# Patient Record
Sex: FEMALE | Race: WHITE | Employment: UNEMPLOYED | URBAN - NONMETROPOLITAN AREA
[De-identification: names, ages, dates, MRNs, and addresses within clinical notes are randomized per-mention and may not be internally consistent; named-entity substitution may affect disease eponyms.]

---

## 2017-04-06 ENCOUNTER — APPOINTMENT (OUTPATIENT)
Dept: GENERAL RADIOLOGY | Age: 42
End: 2017-04-06
Payer: COMMERCIAL

## 2017-04-06 ENCOUNTER — HOSPITAL ENCOUNTER (EMERGENCY)
Age: 42
Discharge: HOME | End: 2017-04-06
Attending: EMERGENCY MEDICINE
Payer: COMMERCIAL

## 2017-04-06 VITALS
OXYGEN SATURATION: 98 % | RESPIRATION RATE: 16 BRPM | TEMPERATURE: 97.8 F | HEART RATE: 87 BPM | DIASTOLIC BLOOD PRESSURE: 89 MMHG | SYSTOLIC BLOOD PRESSURE: 140 MMHG

## 2017-04-06 DIAGNOSIS — G43.A0 CYCLICAL VOMITING WITHOUT NAUSEA, INTRACTABILITY OF VOMITING NOT SPECIFIED: Primary | ICD-10-CM

## 2017-04-06 LAB
-: ABNORMAL
ABSOLUTE EOS #: 0 K/UL (ref 0–0.4)
ABSOLUTE LYMPH #: 1.3 K/UL (ref 1.1–2.7)
ABSOLUTE MONO #: 0.3 K/UL (ref 0–1)
AMORPHOUS: ABNORMAL
ANION GAP SERPL CALCULATED.3IONS-SCNC: 22 MMOL/L (ref 9–17)
BACTERIA: ABNORMAL
BASOPHILS # BLD: 1 % (ref 0–2)
BASOPHILS ABSOLUTE: 0.1 K/UL (ref 0–0.2)
BILIRUBIN URINE: NEGATIVE
BUN BLDV-MCNC: 15 MG/DL (ref 6–20)
BUN/CREAT BLD: 22 (ref 9–20)
CALCIUM SERPL-MCNC: 9.9 MG/DL (ref 8.6–10.4)
CASTS UA: ABNORMAL /LPF
CHLORIDE BLD-SCNC: 98 MMOL/L (ref 98–107)
CO2: 18 MMOL/L (ref 20–31)
COLOR: YELLOW
COMMENT UA: ABNORMAL
CREAT SERPL-MCNC: 0.68 MG/DL (ref 0.5–0.9)
CRYSTALS, UA: ABNORMAL /HPF
DIFFERENTIAL TYPE: YES
EOSINOPHILS RELATIVE PERCENT: 0 % (ref 0–5)
EPITHELIAL CELLS UA: ABNORMAL /HPF
ETHANOL PERCENT: <0.01 %
ETHANOL: <10 MG/DL
GFR AFRICAN AMERICAN: >60 ML/MIN
GFR NON-AFRICAN AMERICAN: >60 ML/MIN
GFR SERPL CREATININE-BSD FRML MDRD: ABNORMAL ML/MIN/{1.73_M2}
GFR SERPL CREATININE-BSD FRML MDRD: ABNORMAL ML/MIN/{1.73_M2}
GLUCOSE BLD-MCNC: 81 MG/DL (ref 70–99)
GLUCOSE URINE: NEGATIVE
HCG(URINE) PREGNANCY TEST: NEGATIVE
HCT VFR BLD CALC: 46.8 % (ref 36–46)
HEMOGLOBIN: 15.4 G/DL (ref 12–16)
KETONES, URINE: ABNORMAL
LEUKOCYTE ESTERASE, URINE: NEGATIVE
LIPASE: 15 U/L (ref 13–60)
LYMPHOCYTES # BLD: 15 % (ref 15–40)
MCH RBC QN AUTO: 28.4 PG (ref 26–34)
MCHC RBC AUTO-ENTMCNC: 33 G/DL (ref 31–37)
MCV RBC AUTO: 86.1 FL (ref 80–100)
MONOCYTES # BLD: 3 % (ref 4–8)
MUCUS: ABNORMAL
NITRITE, URINE: NEGATIVE
OTHER OBSERVATIONS UA: ABNORMAL
PDW BLD-RTO: 15.4 % (ref 12.1–15.2)
PH UA: 6 (ref 5–8)
PLATELET # BLD: 306 K/UL (ref 140–450)
PLATELET ESTIMATE: ABNORMAL
PMV BLD AUTO: ABNORMAL FL (ref 6–12)
POTASSIUM SERPL-SCNC: 4.2 MMOL/L (ref 3.7–5.3)
PROTEIN UA: ABNORMAL
RBC # BLD: 5.44 M/UL (ref 4–5.2)
RBC # BLD: ABNORMAL 10*6/UL
RBC UA: ABNORMAL /HPF (ref 0–2)
RENAL EPITHELIAL, UA: ABNORMAL /HPF
SEG NEUTROPHILS: 81 % (ref 47–75)
SEGMENTED NEUTROPHILS ABSOLUTE COUNT: 6.9 K/UL (ref 2.5–7)
SODIUM BLD-SCNC: 138 MMOL/L (ref 135–144)
SPECIFIC GRAVITY UA: 1.03 (ref 1–1.03)
TRICHOMONAS: ABNORMAL
TURBIDITY: CLEAR
URINE HGB: ABNORMAL
UROBILINOGEN, URINE: NORMAL
WBC # BLD: 8.6 K/UL (ref 3.5–11)
WBC # BLD: ABNORMAL 10*3/UL
WBC UA: ABNORMAL /HPF
YEAST: ABNORMAL

## 2017-04-06 PROCEDURE — 80048 BASIC METABOLIC PNL TOTAL CA: CPT

## 2017-04-06 PROCEDURE — 74022 RADEX COMPL AQT ABD SERIES: CPT

## 2017-04-06 PROCEDURE — 83690 ASSAY OF LIPASE: CPT

## 2017-04-06 PROCEDURE — 85025 COMPLETE CBC W/AUTO DIFF WBC: CPT

## 2017-04-06 PROCEDURE — 6360000002 HC RX W HCPCS: Performed by: EMERGENCY MEDICINE

## 2017-04-06 PROCEDURE — 99284 EMERGENCY DEPT VISIT MOD MDM: CPT

## 2017-04-06 PROCEDURE — 84703 CHORIONIC GONADOTROPIN ASSAY: CPT

## 2017-04-06 PROCEDURE — 81001 URINALYSIS AUTO W/SCOPE: CPT

## 2017-04-06 PROCEDURE — G0480 DRUG TEST DEF 1-7 CLASSES: HCPCS

## 2017-04-06 RX ORDER — ONDANSETRON 2 MG/ML
4 INJECTION INTRAMUSCULAR; INTRAVENOUS ONCE
Status: DISCONTINUED | OUTPATIENT
Start: 2017-04-06 | End: 2017-04-06

## 2017-04-06 RX ORDER — ONDANSETRON 4 MG/1
4 TABLET, ORALLY DISINTEGRATING ORAL EVERY 8 HOURS PRN
Qty: 6 TABLET | Refills: 0 | Status: SHIPPED | OUTPATIENT
Start: 2017-04-06 | End: 2017-04-08

## 2017-04-06 RX ORDER — ONDANSETRON 4 MG/1
4 TABLET, ORALLY DISINTEGRATING ORAL ONCE
Status: DISCONTINUED | OUTPATIENT
Start: 2017-04-06 | End: 2017-04-06

## 2017-04-06 RX ORDER — ONDANSETRON 4 MG/1
4 TABLET, ORALLY DISINTEGRATING ORAL ONCE
Status: COMPLETED | OUTPATIENT
Start: 2017-04-06 | End: 2017-04-06

## 2017-04-06 RX ADMIN — ONDANSETRON 4 MG: 4 TABLET, ORALLY DISINTEGRATING ORAL at 13:47

## 2017-04-06 ASSESSMENT — ENCOUNTER SYMPTOMS
ANAL BLEEDING: 0
ALLERGIC/IMMUNOLOGIC NEGATIVE: 1
RECENT COUGH: 0
NAUSEA: 1
VOMITING: 1
EYES NEGATIVE: 1
RECTAL PAIN: 0
DIARRHEA: 0
BLOOD IN STOOL: 0
SORE THROAT: 0
CONSTIPATION: 0
ABDOMINAL PAIN: 0
RESPIRATORY NEGATIVE: 1
ABDOMINAL DISTENTION: 0

## 2017-04-10 ENCOUNTER — HOSPITAL ENCOUNTER (EMERGENCY)
Age: 42
Discharge: HOME | End: 2017-04-10
Attending: EMERGENCY MEDICINE
Payer: COMMERCIAL

## 2017-04-10 VITALS
DIASTOLIC BLOOD PRESSURE: 100 MMHG | OXYGEN SATURATION: 100 % | HEART RATE: 71 BPM | RESPIRATION RATE: 16 BRPM | TEMPERATURE: 99.6 F | SYSTOLIC BLOOD PRESSURE: 168 MMHG

## 2017-04-10 DIAGNOSIS — K29.00 ACUTE GASTRITIS WITHOUT HEMORRHAGE, UNSPECIFIED GASTRITIS TYPE: ICD-10-CM

## 2017-04-10 DIAGNOSIS — R10.13 ABDOMINAL PAIN, EPIGASTRIC: Primary | ICD-10-CM

## 2017-04-10 LAB
ABSOLUTE BANDS #: 0.07 K/UL (ref 0–1)
ABSOLUTE EOS #: 0.07 K/UL (ref 0–0.4)
ABSOLUTE LYMPH #: 0.78 K/UL (ref 1.1–2.7)
ABSOLUTE MONO #: 0.36 K/UL (ref 0–1)
ALBUMIN SERPL-MCNC: 4.3 G/DL (ref 3.5–5.2)
ALBUMIN/GLOBULIN RATIO: ABNORMAL (ref 1–2.5)
ALP BLD-CCNC: 73 U/L (ref 35–104)
ALT SERPL-CCNC: 15 U/L (ref 5–33)
ANION GAP SERPL CALCULATED.3IONS-SCNC: 14 MMOL/L (ref 9–17)
AST SERPL-CCNC: 15 U/L
BANDS: 1 % (ref 0–10)
BASOPHILS # BLD: ABNORMAL % (ref 0–2)
BASOPHILS ABSOLUTE: ABNORMAL K/UL (ref 0–0.2)
BILIRUB SERPL-MCNC: 0.34 MG/DL (ref 0.3–1.2)
BUN BLDV-MCNC: 14 MG/DL (ref 6–20)
BUN/CREAT BLD: 22 (ref 9–20)
CALCIUM SERPL-MCNC: 9.6 MG/DL (ref 8.6–10.4)
CHLORIDE BLD-SCNC: 100 MMOL/L (ref 98–107)
CO2: 25 MMOL/L (ref 20–31)
CREAT SERPL-MCNC: 0.65 MG/DL (ref 0.5–0.9)
DIFFERENTIAL TYPE: ABNORMAL
EOSINOPHILS RELATIVE PERCENT: 1 % (ref 0–5)
GFR AFRICAN AMERICAN: >60 ML/MIN
GFR NON-AFRICAN AMERICAN: >60 ML/MIN
GFR SERPL CREATININE-BSD FRML MDRD: ABNORMAL ML/MIN/{1.73_M2}
GFR SERPL CREATININE-BSD FRML MDRD: ABNORMAL ML/MIN/{1.73_M2}
GLUCOSE BLD-MCNC: 119 MG/DL (ref 70–99)
HCT VFR BLD CALC: 45.1 % (ref 36–46)
HEMOGLOBIN: 14.7 G/DL (ref 12–16)
LIPASE: 23 U/L (ref 13–60)
LYMPHOCYTES # BLD: 11 % (ref 15–40)
MCH RBC QN AUTO: 28.1 PG (ref 26–34)
MCHC RBC AUTO-ENTMCNC: 32.7 G/DL (ref 31–37)
MCV RBC AUTO: 86 FL (ref 80–100)
MONOCYTES # BLD: 5 % (ref 4–8)
MORPHOLOGY: ABNORMAL
PDW BLD-RTO: 15.3 % (ref 12.1–15.2)
PLATELET # BLD: 195 K/UL (ref 140–450)
PLATELET ESTIMATE: ABNORMAL
PMV BLD AUTO: ABNORMAL FL (ref 6–12)
POTASSIUM SERPL-SCNC: 3.9 MMOL/L (ref 3.7–5.3)
RBC # BLD: 5.24 M/UL (ref 4–5.2)
RBC # BLD: ABNORMAL 10*6/UL
SEG NEUTROPHILS: 82 % (ref 47–75)
SEGMENTED NEUTROPHILS ABSOLUTE COUNT: 5.82 K/UL (ref 2.5–7)
SODIUM BLD-SCNC: 139 MMOL/L (ref 135–144)
TOTAL PROTEIN: 7.8 G/DL (ref 6.4–8.3)
WBC # BLD: 7.1 K/UL (ref 3.5–11)
WBC # BLD: ABNORMAL 10*3/UL

## 2017-04-10 PROCEDURE — 80053 COMPREHEN METABOLIC PANEL: CPT

## 2017-04-10 PROCEDURE — 36415 COLL VENOUS BLD VENIPUNCTURE: CPT

## 2017-04-10 PROCEDURE — 85025 COMPLETE CBC W/AUTO DIFF WBC: CPT

## 2017-04-10 PROCEDURE — 96372 THER/PROPH/DIAG INJ SC/IM: CPT

## 2017-04-10 PROCEDURE — 83690 ASSAY OF LIPASE: CPT

## 2017-04-10 PROCEDURE — 6360000002 HC RX W HCPCS: Performed by: EMERGENCY MEDICINE

## 2017-04-10 PROCEDURE — 99284 EMERGENCY DEPT VISIT MOD MDM: CPT

## 2017-04-10 RX ORDER — PROMETHAZINE HYDROCHLORIDE 25 MG/1
25 TABLET ORAL EVERY 6 HOURS PRN
Qty: 12 TABLET | Refills: 0 | Status: SHIPPED | OUTPATIENT
Start: 2017-04-10 | End: 2017-04-17

## 2017-04-10 RX ORDER — NALBUPHINE HCL 10 MG/ML
10 AMPUL (ML) INJECTION ONCE
Status: COMPLETED | OUTPATIENT
Start: 2017-04-10 | End: 2017-04-10

## 2017-04-10 RX ORDER — PROMETHAZINE HYDROCHLORIDE 25 MG/ML
25 INJECTION, SOLUTION INTRAMUSCULAR; INTRAVENOUS ONCE
Status: COMPLETED | OUTPATIENT
Start: 2017-04-10 | End: 2017-04-10

## 2017-04-10 RX ORDER — METOCLOPRAMIDE HYDROCHLORIDE 5 MG/ML
10 INJECTION INTRAMUSCULAR; INTRAVENOUS ONCE
Status: COMPLETED | OUTPATIENT
Start: 2017-04-10 | End: 2017-04-10

## 2017-04-10 RX ADMIN — METOCLOPRAMIDE 10 MG: 5 INJECTION, SOLUTION INTRAMUSCULAR; INTRAVENOUS at 13:42

## 2017-04-10 RX ADMIN — NALBUPHINE HYDROCHLORIDE 10 MG: 10 INJECTION, SOLUTION INTRAMUSCULAR; INTRAVENOUS; SUBCUTANEOUS at 13:42

## 2017-04-10 RX ADMIN — PROMETHAZINE HYDROCHLORIDE 25 MG: 25 INJECTION INTRAMUSCULAR; INTRAVENOUS at 11:26

## 2017-04-10 ASSESSMENT — PAIN SCALES - GENERAL: PAINLEVEL_OUTOF10: 9

## 2020-06-29 ENCOUNTER — HOSPITAL ENCOUNTER (EMERGENCY)
Age: 45
Discharge: HOME | End: 2020-06-29
Attending: FAMILY MEDICINE
Payer: COMMERCIAL

## 2020-06-29 VITALS
DIASTOLIC BLOOD PRESSURE: 72 MMHG | OXYGEN SATURATION: 97 % | HEART RATE: 102 BPM | BODY MASS INDEX: 22.74 KG/M2 | TEMPERATURE: 99.4 F | HEIGHT: 69 IN | RESPIRATION RATE: 20 BRPM | SYSTOLIC BLOOD PRESSURE: 162 MMHG

## 2020-06-29 PROCEDURE — 99283 EMERGENCY DEPT VISIT LOW MDM: CPT

## 2020-06-29 RX ORDER — IBUPROFEN 600 MG/1
600 TABLET ORAL EVERY 6 HOURS PRN
Qty: 30 TABLET | Refills: 0 | Status: SHIPPED | OUTPATIENT
Start: 2020-06-29

## 2020-06-29 RX ORDER — AMOXICILLIN AND CLAVULANATE POTASSIUM 875; 125 MG/1; MG/1
1 TABLET, FILM COATED ORAL 2 TIMES DAILY
Qty: 20 TABLET | Refills: 0 | Status: SHIPPED | OUTPATIENT
Start: 2020-06-29 | End: 2020-07-09

## 2020-06-29 RX ORDER — ACETAMINOPHEN 325 MG/1
650 TABLET ORAL EVERY 6 HOURS PRN
COMMUNITY

## 2020-06-29 ASSESSMENT — ENCOUNTER SYMPTOMS
NAUSEA: 0
EYE DISCHARGE: 1
PHOTOPHOBIA: 0

## 2020-06-29 ASSESSMENT — PAIN DESCRIPTION - PROGRESSION: CLINICAL_PROGRESSION: NOT CHANGED

## 2020-06-29 ASSESSMENT — PAIN DESCRIPTION - PAIN TYPE: TYPE: ACUTE PAIN

## 2020-06-29 ASSESSMENT — PAIN DESCRIPTION - ORIENTATION: ORIENTATION: LEFT

## 2020-06-29 ASSESSMENT — PAIN DESCRIPTION - DESCRIPTORS: DESCRIPTORS: HEADACHE

## 2020-06-29 ASSESSMENT — PAIN - FUNCTIONAL ASSESSMENT: PAIN_FUNCTIONAL_ASSESSMENT: ACTIVITIES ARE NOT PREVENTED

## 2020-06-29 ASSESSMENT — PAIN DESCRIPTION - LOCATION: LOCATION: EAR

## 2020-06-29 ASSESSMENT — PAIN DESCRIPTION - FREQUENCY: FREQUENCY: CONTINUOUS

## 2020-06-29 ASSESSMENT — PAIN SCALES - GENERAL: PAINLEVEL_OUTOF10: 7
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
## 2024-04-05 ENCOUNTER — OFFICE VISIT (OUTPATIENT)
Dept: FAMILY MEDICINE CLINIC | Age: 49
End: 2024-04-05

## 2024-04-05 VITALS
DIASTOLIC BLOOD PRESSURE: 80 MMHG | SYSTOLIC BLOOD PRESSURE: 124 MMHG | OXYGEN SATURATION: 99 % | BODY MASS INDEX: 23.13 KG/M2 | WEIGHT: 156.2 LBS | HEART RATE: 92 BPM | HEIGHT: 69 IN

## 2024-04-05 DIAGNOSIS — F19.91 HISTORY OF DRUG USE: ICD-10-CM

## 2024-04-05 DIAGNOSIS — F31.5 BIPOLAR DISORDER, CURR EPISODE DEPRESSED, SEVERE, W/PSYCHOTIC FEATURES (HCC): ICD-10-CM

## 2024-04-05 DIAGNOSIS — B18.2 CHRONIC HEPATITIS C WITHOUT HEPATIC COMA (HCC): ICD-10-CM

## 2024-04-05 DIAGNOSIS — J45.20 MILD INTERMITTENT ASTHMA WITHOUT COMPLICATION: ICD-10-CM

## 2024-04-05 DIAGNOSIS — K50.10 CROHN'S DISEASE OF COLON WITHOUT COMPLICATION (HCC): ICD-10-CM

## 2024-04-05 DIAGNOSIS — F51.01 PRIMARY INSOMNIA: Primary | ICD-10-CM

## 2024-04-05 DIAGNOSIS — F33.3 SEVERE EPISODE OF RECURRENT MAJOR DEPRESSIVE DISORDER, WITH PSYCHOTIC FEATURES (HCC): ICD-10-CM

## 2024-04-05 DIAGNOSIS — G62.9 POLYNEUROPATHY: ICD-10-CM

## 2024-04-05 RX ORDER — ALBUTEROL SULFATE 90 UG/1
2 AEROSOL, METERED RESPIRATORY (INHALATION) EVERY 6 HOURS PRN
COMMUNITY
Start: 2023-08-19 | End: 2024-04-05 | Stop reason: SDUPTHER

## 2024-04-05 RX ORDER — RISPERIDONE 1 MG/1
1 TABLET ORAL 2 TIMES DAILY
COMMUNITY
Start: 2024-01-10 | End: 2024-04-05 | Stop reason: SDUPTHER

## 2024-04-05 RX ORDER — RISPERIDONE 1 MG/1
1 TABLET ORAL 2 TIMES DAILY
Qty: 60 TABLET | Refills: 2 | Status: SHIPPED | OUTPATIENT
Start: 2024-04-05

## 2024-04-05 RX ORDER — ALBUTEROL SULFATE 90 UG/1
2 AEROSOL, METERED RESPIRATORY (INHALATION) EVERY 6 HOURS PRN
Qty: 18 G | Refills: 2 | Status: SHIPPED | OUTPATIENT
Start: 2024-04-05

## 2024-04-05 RX ORDER — TRAZODONE HYDROCHLORIDE 50 MG/1
50 TABLET ORAL NIGHTLY
COMMUNITY
Start: 2024-01-10 | End: 2024-04-05 | Stop reason: SDUPTHER

## 2024-04-05 RX ORDER — TRAZODONE HYDROCHLORIDE 100 MG/1
100 TABLET ORAL NIGHTLY
Qty: 30 TABLET | Refills: 2 | Status: SHIPPED | OUTPATIENT
Start: 2024-04-05

## 2024-04-05 RX ORDER — QUETIAPINE FUMARATE 50 MG/1
50 TABLET, FILM COATED ORAL NIGHTLY
Qty: 60 TABLET | Status: CANCELLED | OUTPATIENT
Start: 2024-04-05

## 2024-04-05 RX ORDER — QUETIAPINE FUMARATE 50 MG/1
50 TABLET, FILM COATED ORAL NIGHTLY
COMMUNITY
Start: 2023-08-19

## 2024-04-05 SDOH — ECONOMIC STABILITY: HOUSING INSECURITY
IN THE LAST 12 MONTHS, WAS THERE A TIME WHEN YOU DID NOT HAVE A STEADY PLACE TO SLEEP OR SLEPT IN A SHELTER (INCLUDING NOW)?: NO

## 2024-04-05 SDOH — ECONOMIC STABILITY: FOOD INSECURITY: WITHIN THE PAST 12 MONTHS, YOU WORRIED THAT YOUR FOOD WOULD RUN OUT BEFORE YOU GOT MONEY TO BUY MORE.: NEVER TRUE

## 2024-04-05 SDOH — ECONOMIC STABILITY: INCOME INSECURITY: HOW HARD IS IT FOR YOU TO PAY FOR THE VERY BASICS LIKE FOOD, HOUSING, MEDICAL CARE, AND HEATING?: NOT HARD AT ALL

## 2024-04-05 SDOH — ECONOMIC STABILITY: FOOD INSECURITY: WITHIN THE PAST 12 MONTHS, THE FOOD YOU BOUGHT JUST DIDN'T LAST AND YOU DIDN'T HAVE MONEY TO GET MORE.: NEVER TRUE

## 2024-04-05 ASSESSMENT — ENCOUNTER SYMPTOMS
BLOOD IN STOOL: 0
DIARRHEA: 0
COUGH: 0
SHORTNESS OF BREATH: 0
ABDOMINAL PAIN: 0
VOMITING: 0
BACK PAIN: 0
SORE THROAT: 0
CONSTIPATION: 0

## 2024-04-05 ASSESSMENT — PATIENT HEALTH QUESTIONNAIRE - PHQ9
SUM OF ALL RESPONSES TO PHQ9 QUESTIONS 1 & 2: 2
SUM OF ALL RESPONSES TO PHQ QUESTIONS 1-9: 2
1. LITTLE INTEREST OR PLEASURE IN DOING THINGS: SEVERAL DAYS
SUM OF ALL RESPONSES TO PHQ QUESTIONS 1-9: 2
SUM OF ALL RESPONSES TO PHQ QUESTIONS 1-9: 2
2. FEELING DOWN, DEPRESSED OR HOPELESS: SEVERAL DAYS
SUM OF ALL RESPONSES TO PHQ QUESTIONS 1-9: 2
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
## 2024-10-09 PROBLEM — B19.20 HEPATITIS C VIRUS INFECTION: Status: ACTIVE | Noted: 2024-10-09

## 2025-01-14 ENCOUNTER — HOSPITAL ENCOUNTER (EMERGENCY)
Age: 50
Discharge: ANOTHER HOSPITAL | End: 2025-01-15
Attending: EMERGENCY MEDICINE
Payer: COMMERCIAL

## 2025-01-14 VITALS
HEART RATE: 94 BPM | RESPIRATION RATE: 19 BRPM | SYSTOLIC BLOOD PRESSURE: 137 MMHG | DIASTOLIC BLOOD PRESSURE: 74 MMHG | BODY MASS INDEX: 20.9 KG/M2 | TEMPERATURE: 98.6 F | OXYGEN SATURATION: 98 % | HEIGHT: 68 IN | WEIGHT: 137.9 LBS

## 2025-01-14 DIAGNOSIS — F30.9 MANIC EPISODE (HCC): Primary | ICD-10-CM

## 2025-01-14 DIAGNOSIS — F31.9 BIPOLAR 1 DISORDER (HCC): ICD-10-CM

## 2025-01-14 LAB
AMPHET UR QL SCN: POSITIVE
ANION GAP SERPL CALCULATED.3IONS-SCNC: 9 MMOL/L (ref 9–17)
BARBITURATES UR QL SCN: NEGATIVE
BENZODIAZ UR QL: NEGATIVE
BUN SERPL-MCNC: 10 MG/DL (ref 6–20)
BUN/CREAT SERPL: 17 (ref 9–20)
CALCIUM SERPL-MCNC: 9.1 MG/DL (ref 8.6–10.4)
CANNABINOIDS UR QL SCN: POSITIVE
CHLORIDE SERPL-SCNC: 98 MMOL/L (ref 98–107)
CO2 SERPL-SCNC: 26 MMOL/L (ref 20–31)
COCAINE UR QL SCN: NEGATIVE
CREAT SERPL-MCNC: 0.6 MG/DL (ref 0.5–0.9)
ERYTHROCYTE [DISTWIDTH] IN BLOOD BY AUTOMATED COUNT: 13.9 % (ref 12.1–15.2)
ETHANOL PERCENT: <0.01 %
ETHANOLAMINE SERPL-MCNC: <10 MG/DL (ref 0–0.08)
FENTANYL UR QL: NEGATIVE
GFR, ESTIMATED: >90 ML/MIN/1.73M2
GLUCOSE SERPL-MCNC: 145 MG/DL (ref 70–99)
HCG UR QL: NEGATIVE
HCT VFR BLD AUTO: 36.2 % (ref 36–46)
HGB BLD-MCNC: 12.1 G/DL (ref 12–16)
MCH RBC QN AUTO: 28.7 PG (ref 26–34)
MCHC RBC AUTO-ENTMCNC: 33.4 G/DL (ref 31–37)
MCV RBC AUTO: 86 FL (ref 80–100)
METHADONE UR QL: NEGATIVE
OPIATES UR QL SCN: NEGATIVE
OXYCODONE UR QL SCN: NEGATIVE
PCP UR QL SCN: NEGATIVE
PLATELET # BLD AUTO: 425 K/UL (ref 140–450)
PMV BLD AUTO: 9.9 FL (ref 6–12)
POTASSIUM SERPL-SCNC: 3.7 MMOL/L (ref 3.7–5.3)
RBC # BLD AUTO: 4.21 M/UL (ref 4–5.2)
SODIUM SERPL-SCNC: 133 MMOL/L (ref 135–144)
TEST INFORMATION: ABNORMAL
TSH SERPL DL<=0.05 MIU/L-ACNC: 1.44 UIU/ML (ref 0.3–5)
WBC OTHER # BLD: 9 K/UL (ref 3.5–11)

## 2025-01-14 PROCEDURE — 36415 COLL VENOUS BLD VENIPUNCTURE: CPT

## 2025-01-14 PROCEDURE — 84443 ASSAY THYROID STIM HORMONE: CPT

## 2025-01-14 PROCEDURE — 80048 BASIC METABOLIC PNL TOTAL CA: CPT

## 2025-01-14 PROCEDURE — G0480 DRUG TEST DEF 1-7 CLASSES: HCPCS

## 2025-01-14 PROCEDURE — 99285 EMERGENCY DEPT VISIT HI MDM: CPT

## 2025-01-14 PROCEDURE — 93005 ELECTROCARDIOGRAM TRACING: CPT | Performed by: EMERGENCY MEDICINE

## 2025-01-14 PROCEDURE — 85027 COMPLETE CBC AUTOMATED: CPT

## 2025-01-14 PROCEDURE — 81025 URINE PREGNANCY TEST: CPT

## 2025-01-14 PROCEDURE — 80307 DRUG TEST PRSMV CHEM ANLYZR: CPT

## 2025-01-14 PROCEDURE — 6370000000 HC RX 637 (ALT 250 FOR IP): Performed by: EMERGENCY MEDICINE

## 2025-01-14 RX ORDER — ACETAMINOPHEN 325 MG/1
650 TABLET ORAL ONCE
Status: COMPLETED | OUTPATIENT
Start: 2025-01-14 | End: 2025-01-14

## 2025-01-14 RX ADMIN — ACETAMINOPHEN 650 MG: 325 TABLET, FILM COATED ORAL at 23:19

## 2025-01-14 ASSESSMENT — LIFESTYLE VARIABLES
HOW MANY STANDARD DRINKS CONTAINING ALCOHOL DO YOU HAVE ON A TYPICAL DAY: 3 OR 4
HOW OFTEN DO YOU HAVE A DRINK CONTAINING ALCOHOL: 2-3 TIMES A WEEK

## 2025-01-14 ASSESSMENT — PAIN SCALES - GENERAL
PAINLEVEL_OUTOF10: 6
PAINLEVEL_OUTOF10: 4

## 2025-01-14 ASSESSMENT — PAIN - FUNCTIONAL ASSESSMENT: PAIN_FUNCTIONAL_ASSESSMENT: 0-10

## 2025-01-14 ASSESSMENT — ENCOUNTER SYMPTOMS: ABDOMINAL PAIN: 0

## 2025-01-14 ASSESSMENT — PAIN DESCRIPTION - ORIENTATION: ORIENTATION: POSTERIOR

## 2025-01-14 ASSESSMENT — PAIN DESCRIPTION - DESCRIPTORS
DESCRIPTORS: ACHING
DESCRIPTORS: ACHING

## 2025-01-14 ASSESSMENT — PAIN DESCRIPTION - LOCATION
LOCATION: HEAD
LOCATION: HEAD

## 2025-01-14 ASSESSMENT — PAIN DESCRIPTION - FREQUENCY: FREQUENCY: CONTINUOUS

## 2025-01-14 ASSESSMENT — PAIN DESCRIPTION - PAIN TYPE: TYPE: ACUTE PAIN

## 2025-01-15 LAB
EKG ATRIAL RATE: 101 BPM
EKG P AXIS: 86 DEGREES
EKG P-R INTERVAL: 120 MS
EKG Q-T INTERVAL: 362 MS
EKG QRS DURATION: 84 MS
EKG QTC CALCULATION (BAZETT): 469 MS
EKG R AXIS: 71 DEGREES
EKG T AXIS: 62 DEGREES
EKG VENTRICULAR RATE: 101 BPM

## 2025-01-15 PROCEDURE — 93010 ELECTROCARDIOGRAM REPORT: CPT | Performed by: INTERNAL MEDICINE
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
## 2025-01-15 NOTE — ED TRIAGE NOTES
Medical and surgical hx verbally reviewed wit patient. Patient is unaware of the last time she took her medication. States she is not taking daily medication at this time.

## 2025-01-15 NOTE — ED PROVIDER NOTES
capsule by mouth daily    QUETIAPINE (SEROQUEL) 50 MG TABLET    Take 1 tablet by mouth nightly    RISPERIDONE (RISPERDAL) 1 MG TABLET    Take 1 tablet by mouth 2 times daily    TRAZODONE (DESYREL) 100 MG TABLET    Take 1 tablet by mouth nightly       ALLERGIES       Phenobarbital    FAMILY HISTORY       Family History   Problem Relation Age of Onset    Neuropathy Mother     Neuropathy Father           SOCIAL HISTORY       Social History     Tobacco Use    Smoking status: Every Day     Current packs/day: 0.50     Types: Cigarettes   Vaping Use    Vaping status: Never Used   Substance Use Topics    Alcohol use: Yes     Alcohol/week: 3.0 standard drinks of alcohol     Types: 3 Shots of liquor per week     Comment: couple times per week; beer 6-7; \"I just started that shit, I take 3 double shots of fireball\"    Drug use: Yes     Frequency: 7.0 times per week     Types: Marijuana (Weed), Methamphetamines (Crystal Meth), Cocaine     Comment: occasionally         PHYSICAL EXAM       ED Triage Vitals [01/14/25 2023]   BP Systolic BP Percentile Diastolic BP Percentile Temp Temp Source Pulse Respirations SpO2   (!) 157/120 -- -- 98.6 °F (37 °C) Oral (!) 139 23 95 %      Height Weight - Scale         1.727 m (5' 8\") 62.6 kg (137 lb 14.4 oz)             Physical Exam  Constitutional:       Appearance: Normal appearance. She is normal weight.   HENT:      Head: Normocephalic and atraumatic.      Nose: Nose normal.      Mouth/Throat:      Pharynx: No oropharyngeal exudate or posterior oropharyngeal erythema.   Eyes:      Extraocular Movements: Extraocular movements intact.      Pupils: Pupils are equal, round, and reactive to light.   Cardiovascular:      Rate and Rhythm: Tachycardia present.      Pulses: Normal pulses.      Heart sounds: Normal heart sounds.   Pulmonary:      Effort: Pulmonary effort is normal. No respiratory distress.      Breath sounds: Normal breath sounds.   Abdominal:      General: Abdomen is flat. There is

## 2025-01-28 ENCOUNTER — HOSPITAL ENCOUNTER (EMERGENCY)
Age: 50
Discharge: ANOTHER HOSPITAL | End: 2025-01-29
Attending: FAMILY MEDICINE
Payer: COMMERCIAL

## 2025-01-28 DIAGNOSIS — Z91.148 NON COMPLIANCE W MEDICATION REGIMEN: ICD-10-CM

## 2025-01-28 DIAGNOSIS — Z86.59 HISTORY OF PSYCHIATRIC HOSPITALIZATION: ICD-10-CM

## 2025-01-28 DIAGNOSIS — F23 ACUTE PSYCHOSIS (HCC): Primary | ICD-10-CM

## 2025-01-28 LAB
-: NORMAL
ALBUMIN SERPL-MCNC: 4.3 G/DL (ref 3.5–5.2)
ALP SERPL-CCNC: 90 U/L (ref 35–104)
ALT SERPL-CCNC: 14 U/L (ref 5–33)
AMPHET UR QL SCN: POSITIVE
ANION GAP SERPL CALCULATED.3IONS-SCNC: 15 MMOL/L (ref 9–17)
APAP SERPL-MCNC: <5 UG/ML (ref 10–30)
AST SERPL-CCNC: 17 U/L
BARBITURATES UR QL SCN: NEGATIVE
BASOPHILS # BLD: 0.02 K/UL (ref 0–0.2)
BASOPHILS NFR BLD: 0 % (ref 0–2)
BENZODIAZ UR QL: NEGATIVE
BILIRUB SERPL-MCNC: 0.2 MG/DL (ref 0.3–1.2)
BILIRUB UR QL STRIP: NEGATIVE
BUN SERPL-MCNC: 5 MG/DL (ref 6–20)
BUN/CREAT SERPL: 7 (ref 9–20)
CALCIUM SERPL-MCNC: 9.1 MG/DL (ref 8.6–10.4)
CANNABINOIDS UR QL SCN: POSITIVE
CHLORIDE SERPL-SCNC: 103 MMOL/L (ref 98–107)
CLARITY UR: CLEAR
CO2 SERPL-SCNC: 20 MMOL/L (ref 20–31)
COCAINE UR QL SCN: NEGATIVE
COLOR UR: ABNORMAL
COMMENT: ABNORMAL
CREAT SERPL-MCNC: 0.7 MG/DL (ref 0.5–0.9)
EOSINOPHIL # BLD: 0.14 K/UL (ref 0–0.4)
EOSINOPHILS RELATIVE PERCENT: 2 % (ref 0–5)
EPI CELLS #/AREA URNS HPF: NORMAL /HPF
ERYTHROCYTE [DISTWIDTH] IN BLOOD BY AUTOMATED COUNT: 14 % (ref 12.1–15.2)
ETHANOL PERCENT: <0.01 %
ETHANOLAMINE SERPL-MCNC: <10 MG/DL (ref 0–0.08)
FENTANYL UR QL: NEGATIVE
GFR, ESTIMATED: >90 ML/MIN/1.73M2
GLUCOSE SERPL-MCNC: 103 MG/DL (ref 70–99)
GLUCOSE UR STRIP-MCNC: NEGATIVE MG/DL
HCG SERPL QL: NEGATIVE
HCT VFR BLD AUTO: 35.1 % (ref 36–46)
HGB BLD-MCNC: 11.6 G/DL (ref 12–16)
HGB UR QL STRIP.AUTO: ABNORMAL
IMM GRANULOCYTES # BLD AUTO: 0 K/UL (ref 0–0.3)
IMM GRANULOCYTES NFR BLD: 0 % (ref 0–5)
KETONES UR STRIP-MCNC: NEGATIVE MG/DL
LEUKOCYTE ESTERASE UR QL STRIP: ABNORMAL
LYMPHOCYTES NFR BLD: 1.58 K/UL (ref 1–4.8)
LYMPHOCYTES RELATIVE PERCENT: 19 % (ref 15–40)
MCH RBC QN AUTO: 28.3 PG (ref 26–34)
MCHC RBC AUTO-ENTMCNC: 33 G/DL (ref 31–37)
MCV RBC AUTO: 85.6 FL (ref 80–100)
METHADONE UR QL: NEGATIVE
MONOCYTES NFR BLD: 0.49 K/UL (ref 0–1)
MONOCYTES NFR BLD: 6 % (ref 4–8)
NEUTROPHILS NFR BLD: 73 % (ref 47–75)
NEUTS SEG NFR BLD: 6.06 K/UL (ref 2.5–7)
NITRITE UR QL STRIP: NEGATIVE
OPIATES UR QL SCN: NEGATIVE
OXYCODONE UR QL SCN: NEGATIVE
PCP UR QL SCN: NEGATIVE
PH UR STRIP: 6 [PH] (ref 5–8)
PLATELET # BLD AUTO: 417 K/UL (ref 140–450)
PMV BLD AUTO: 9.7 FL (ref 6–12)
POTASSIUM SERPL-SCNC: 3.3 MMOL/L (ref 3.7–5.3)
PROT SERPL-MCNC: 7.2 G/DL (ref 6.4–8.3)
PROT UR STRIP-MCNC: ABNORMAL MG/DL
RBC # BLD AUTO: 4.1 M/UL (ref 4–5.2)
RBC #/AREA URNS HPF: NORMAL /HPF (ref 0–2)
SALICYLATES SERPL-MCNC: <1 MG/DL (ref 3–10)
SODIUM SERPL-SCNC: 138 MMOL/L (ref 135–144)
SP GR UR STRIP: 1.01 (ref 1–1.03)
TEST INFORMATION: ABNORMAL
UROBILINOGEN UR STRIP-ACNC: NORMAL EU/DL (ref 0–1)
WBC #/AREA URNS HPF: NORMAL /HPF
WBC OTHER # BLD: 8.3 K/UL (ref 3.5–11)

## 2025-01-28 PROCEDURE — 36415 COLL VENOUS BLD VENIPUNCTURE: CPT

## 2025-01-28 PROCEDURE — 87186 SC STD MICRODIL/AGAR DIL: CPT

## 2025-01-28 PROCEDURE — 81001 URINALYSIS AUTO W/SCOPE: CPT

## 2025-01-28 PROCEDURE — 85025 COMPLETE CBC W/AUTO DIFF WBC: CPT

## 2025-01-28 PROCEDURE — G0480 DRUG TEST DEF 1-7 CLASSES: HCPCS

## 2025-01-28 PROCEDURE — 86403 PARTICLE AGGLUT ANTBDY SCRN: CPT

## 2025-01-28 PROCEDURE — 84703 CHORIONIC GONADOTROPIN ASSAY: CPT

## 2025-01-28 PROCEDURE — 80053 COMPREHEN METABOLIC PANEL: CPT

## 2025-01-28 PROCEDURE — 6370000000 HC RX 637 (ALT 250 FOR IP): Performed by: FAMILY MEDICINE

## 2025-01-28 PROCEDURE — 80307 DRUG TEST PRSMV CHEM ANLYZR: CPT

## 2025-01-28 PROCEDURE — 80143 DRUG ASSAY ACETAMINOPHEN: CPT

## 2025-01-28 PROCEDURE — 87086 URINE CULTURE/COLONY COUNT: CPT

## 2025-01-28 PROCEDURE — 99285 EMERGENCY DEPT VISIT HI MDM: CPT

## 2025-01-28 PROCEDURE — 80179 DRUG ASSAY SALICYLATE: CPT

## 2025-01-28 RX ORDER — ACETAMINOPHEN 500 MG
1000 TABLET ORAL ONCE
Status: COMPLETED | OUTPATIENT
Start: 2025-01-28 | End: 2025-01-28

## 2025-01-28 RX ORDER — LAMOTRIGINE 25 MG/1
50 TABLET ORAL 2 TIMES DAILY
Status: ON HOLD | COMMUNITY

## 2025-01-28 RX ORDER — HYDROXYZINE HYDROCHLORIDE 50 MG/1
50 TABLET, FILM COATED ORAL DAILY PRN
Status: ON HOLD | COMMUNITY

## 2025-01-28 RX ADMIN — ACETAMINOPHEN 1000 MG: 500 TABLET, FILM COATED ORAL at 17:27

## 2025-01-28 ASSESSMENT — PAIN - FUNCTIONAL ASSESSMENT
PAIN_FUNCTIONAL_ASSESSMENT: NONE - DENIES PAIN
PAIN_FUNCTIONAL_ASSESSMENT: 0-10
PAIN_FUNCTIONAL_ASSESSMENT: NONE - DENIES PAIN

## 2025-01-28 ASSESSMENT — PAIN DESCRIPTION - DESCRIPTORS
DESCRIPTORS: TIGHTNESS
DESCRIPTORS: SHARP

## 2025-01-28 ASSESSMENT — PAIN SCALES - GENERAL
PAINLEVEL_OUTOF10: 9
PAINLEVEL_OUTOF10: 9

## 2025-01-28 ASSESSMENT — LIFESTYLE VARIABLES
HOW MANY STANDARD DRINKS CONTAINING ALCOHOL DO YOU HAVE ON A TYPICAL DAY: PATIENT DOES NOT DRINK
HOW OFTEN DO YOU HAVE A DRINK CONTAINING ALCOHOL: NEVER

## 2025-01-28 ASSESSMENT — PAIN DESCRIPTION - LOCATION
LOCATION: CHEST
LOCATION: HEAD

## 2025-01-28 ASSESSMENT — PAIN DESCRIPTION - FREQUENCY: FREQUENCY: INTERMITTENT

## 2025-01-28 NOTE — ED NOTES
Pt c/o being anxious. She was seen two weeks ago in ED for same. Pt states she was sent to the ED by . Pt fearful of going to her home where father is at. Pt restless sitting on bed and appears anxious. Rapid hand and body movements. Pt states she snorted meth three days ago.  During moments of excitement pt c/o chest pain which goes away when calm. Pt in bed. No immediate needs at this time.

## 2025-01-28 NOTE — ED NOTES
Transfer Center Handoff for Behavioral Health Transfers      Patient's Current Location: [facility]  EMERGENCY DEPARTMENT     Chief Complaint   Patient presents with    Anxiety     Pt feeling anxious. Was directed by PO to come to the ED. Was seen here two weeks ago. Pt denies suicidal or homicidal ideations.        Current or History of Violent Behavior: No    Currently in Restraints Now or During this Encounter: No  (Specify if Agitation or self harm is noted in ED?)  If yes, please describe behaviors requiring restraint:             Medical Clearance Documented and Verified in the Chart: Yes    Allergies   Allergen Reactions    Phenobarbital Other (See Comments)     seizure        Can Patient Tolerate Lying Flat: Yes    Able to Perform ADLs:  Yes  (Specify if able to ambulate or uses any mobility devices such as cane or walker)  Activity:    Level of Assistance:    Assistive Device:    Miscellaneous Devices:      LABS    CBC:   Lab Results   Component Value Date/Time    WBC 8.3 01/28/2025 04:15 PM    RBC 4.10 01/28/2025 04:15 PM    HGB 11.6 01/28/2025 04:15 PM    HCT 35.1 01/28/2025 04:15 PM    MCV 85.6 01/28/2025 04:15 PM    MCH 28.3 01/28/2025 04:15 PM    MCHC 33.0 01/28/2025 04:15 PM    RDW 14.0 01/28/2025 04:15 PM     01/28/2025 04:15 PM    MPV 9.7 01/28/2025 04:15 PM     CMP:   Lab Results   Component Value Date/Time     01/28/2025 04:15 PM    K 3.3 01/28/2025 04:15 PM     01/28/2025 04:15 PM    CO2 20 01/28/2025 04:15 PM    BUN 5 01/28/2025 04:15 PM    CREATININE 0.7 01/28/2025 04:15 PM    GFRAA >60 04/10/2017 12:03 PM    LABGLOM >90 01/28/2025 04:15 PM    GLUCOSE 103 01/28/2025 04:15 PM    CALCIUM 9.1 01/28/2025 04:15 PM    BILITOT 0.2 01/28/2025 04:15 PM    ALKPHOS 90 01/28/2025 04:15 PM    AST 17 01/28/2025 04:15 PM    ALT 14 01/28/2025 04:15 PM     Drug Panel:   Lab Results   Component Value Date/Time    OPIAU NEGATIVE 01/28/2025 05:30 PM     UA:  Lab Results   Component Value

## 2025-01-28 NOTE — ED NOTES
Phone provided for pt to call mother. No answer, so pt requesting call to sister. Phone call made and pt conversed with sister. Sister to call mother and answer since she did not know the number. Pt called mother back and conversed with her.

## 2025-01-29 ENCOUNTER — HOSPITAL ENCOUNTER (INPATIENT)
Age: 50
LOS: 6 days | Discharge: HOME | DRG: 753 | End: 2025-02-04
Attending: PSYCHIATRY & NEUROLOGY | Admitting: PSYCHIATRY & NEUROLOGY
Payer: COMMERCIAL

## 2025-01-29 ENCOUNTER — APPOINTMENT (OUTPATIENT)
Dept: GENERAL RADIOLOGY | Age: 50
DRG: 753 | End: 2025-01-29
Attending: INTERNAL MEDICINE
Payer: COMMERCIAL

## 2025-01-29 VITALS
OXYGEN SATURATION: 96 % | SYSTOLIC BLOOD PRESSURE: 131 MMHG | WEIGHT: 140.3 LBS | HEIGHT: 69 IN | RESPIRATION RATE: 18 BRPM | HEART RATE: 78 BPM | TEMPERATURE: 98.2 F | DIASTOLIC BLOOD PRESSURE: 78 MMHG | BODY MASS INDEX: 20.78 KG/M2

## 2025-01-29 PROBLEM — F23 ACUTE PSYCHOSIS (HCC): Status: ACTIVE | Noted: 2025-01-29

## 2025-01-29 PROCEDURE — 6370000000 HC RX 637 (ALT 250 FOR IP): Performed by: INTERNAL MEDICINE

## 2025-01-29 PROCEDURE — 1240000000 HC EMOTIONAL WELLNESS R&B

## 2025-01-29 PROCEDURE — 99222 1ST HOSP IP/OBS MODERATE 55: CPT | Performed by: INTERNAL MEDICINE

## 2025-01-29 PROCEDURE — 73130 X-RAY EXAM OF HAND: CPT

## 2025-01-29 PROCEDURE — 6370000000 HC RX 637 (ALT 250 FOR IP): Performed by: PSYCHIATRY & NEUROLOGY

## 2025-01-29 PROCEDURE — 99223 1ST HOSP IP/OBS HIGH 75: CPT | Performed by: PSYCHIATRY & NEUROLOGY

## 2025-01-29 RX ORDER — POTASSIUM CHLORIDE 1500 MG/1
40 TABLET, EXTENDED RELEASE ORAL ONCE
Status: COMPLETED | OUTPATIENT
Start: 2025-01-29 | End: 2025-01-29

## 2025-01-29 RX ORDER — QUETIAPINE FUMARATE 50 MG/1
50 TABLET, EXTENDED RELEASE ORAL NIGHTLY
Status: ON HOLD | COMMUNITY
End: 2025-02-04 | Stop reason: HOSPADM

## 2025-01-29 RX ORDER — IBUPROFEN 400 MG/1
400 TABLET, FILM COATED ORAL EVERY 6 HOURS PRN
Status: DISCONTINUED | OUTPATIENT
Start: 2025-01-29 | End: 2025-01-29

## 2025-01-29 RX ORDER — MAGNESIUM HYDROXIDE/ALUMINUM HYDROXICE/SIMETHICONE 120; 1200; 1200 MG/30ML; MG/30ML; MG/30ML
30 SUSPENSION ORAL EVERY 6 HOURS PRN
Status: DISCONTINUED | OUTPATIENT
Start: 2025-01-29 | End: 2025-02-04 | Stop reason: HOSPADM

## 2025-01-29 RX ORDER — POLYETHYLENE GLYCOL 3350 17 G
2 POWDER IN PACKET (EA) ORAL
Status: DISCONTINUED | OUTPATIENT
Start: 2025-01-29 | End: 2025-02-04 | Stop reason: HOSPADM

## 2025-01-29 RX ORDER — ACETAMINOPHEN 325 MG/1
650 TABLET ORAL EVERY 6 HOURS PRN
Status: DISCONTINUED | OUTPATIENT
Start: 2025-01-29 | End: 2025-02-04 | Stop reason: HOSPADM

## 2025-01-29 RX ORDER — IBUPROFEN 800 MG/1
800 TABLET, FILM COATED ORAL EVERY 8 HOURS PRN
Status: DISPENSED | OUTPATIENT
Start: 2025-01-29 | End: 2025-02-01

## 2025-01-29 RX ORDER — RISPERIDONE 1 MG/1
1 TABLET ORAL 2 TIMES DAILY
Status: DISCONTINUED | OUTPATIENT
Start: 2025-01-29 | End: 2025-02-04 | Stop reason: HOSPADM

## 2025-01-29 RX ORDER — HYDROXYZINE HYDROCHLORIDE 50 MG/1
50 TABLET, FILM COATED ORAL 3 TIMES DAILY PRN
Status: DISCONTINUED | OUTPATIENT
Start: 2025-01-29 | End: 2025-02-04 | Stop reason: HOSPADM

## 2025-01-29 RX ORDER — TRAZODONE HYDROCHLORIDE 50 MG/1
50 TABLET, FILM COATED ORAL NIGHTLY PRN
Status: DISCONTINUED | OUTPATIENT
Start: 2025-01-29 | End: 2025-02-04 | Stop reason: HOSPADM

## 2025-01-29 RX ORDER — POLYETHYLENE GLYCOL 3350 17 G/17G
17 POWDER, FOR SOLUTION ORAL DAILY PRN
Status: DISCONTINUED | OUTPATIENT
Start: 2025-01-29 | End: 2025-02-04 | Stop reason: HOSPADM

## 2025-01-29 RX ADMIN — ACETAMINOPHEN 650 MG: 325 TABLET ORAL at 20:31

## 2025-01-29 RX ADMIN — IBUPROFEN 800 MG: 800 TABLET, FILM COATED ORAL at 14:02

## 2025-01-29 RX ADMIN — RISPERIDONE 1 MG: 1 TABLET, FILM COATED ORAL at 20:31

## 2025-01-29 RX ADMIN — POTASSIUM CHLORIDE 40 MEQ: 20 TABLET, EXTENDED RELEASE ORAL at 14:02

## 2025-01-29 RX ADMIN — HYDROXYZINE HYDROCHLORIDE 50 MG: 50 TABLET ORAL at 14:05

## 2025-01-29 ASSESSMENT — PAIN DESCRIPTION - DESCRIPTORS
DESCRIPTORS: ACHING
DESCRIPTORS: ACHING

## 2025-01-29 ASSESSMENT — SLEEP AND FATIGUE QUESTIONNAIRES
DO YOU USE A SLEEP AID: YES
SLEEP PATTERN: DIFFICULTY FALLING ASLEEP
AVERAGE NUMBER OF SLEEP HOURS: 5
DO YOU HAVE DIFFICULTY SLEEPING: YES

## 2025-01-29 ASSESSMENT — LIFESTYLE VARIABLES
HOW MANY STANDARD DRINKS CONTAINING ALCOHOL DO YOU HAVE ON A TYPICAL DAY: PATIENT DOES NOT DRINK
HOW MANY STANDARD DRINKS CONTAINING ALCOHOL DO YOU HAVE ON A TYPICAL DAY: PATIENT DOES NOT DRINK
HOW OFTEN DO YOU HAVE A DRINK CONTAINING ALCOHOL: NEVER
HOW OFTEN DO YOU HAVE A DRINK CONTAINING ALCOHOL: NEVER

## 2025-01-29 ASSESSMENT — PAIN DESCRIPTION - LOCATION
LOCATION: HAND
LOCATION: HEAD

## 2025-01-29 ASSESSMENT — PAIN - FUNCTIONAL ASSESSMENT: PAIN_FUNCTIONAL_ASSESSMENT: ACTIVITIES ARE NOT PREVENTED

## 2025-01-29 ASSESSMENT — PATIENT HEALTH QUESTIONNAIRE - PHQ9
1. LITTLE INTEREST OR PLEASURE IN DOING THINGS: SEVERAL DAYS
SUM OF ALL RESPONSES TO PHQ QUESTIONS 1-9: 2
SUM OF ALL RESPONSES TO PHQ9 QUESTIONS 1 & 2: 2
SUM OF ALL RESPONSES TO PHQ QUESTIONS 1-9: 2
2. FEELING DOWN, DEPRESSED OR HOPELESS: SEVERAL DAYS

## 2025-01-29 ASSESSMENT — PAIN DESCRIPTION - ORIENTATION: ORIENTATION: RIGHT;LEFT

## 2025-01-29 ASSESSMENT — PAIN SCALES - GENERAL
PAINLEVEL_OUTOF10: 6
PAINLEVEL_OUTOF10: 9

## 2025-01-29 NOTE — PLAN OF CARE
[facility]  Initial Interdisciplinary Treatment Plan NO      Original treatment plan Date & Time: 1/29/2025   1506    Admission Type:  Admission Type: Voluntary    Reason for admission:   Reason for Admission: Pt c/o being anxious. She was seen two weeks ago in ED for same. Pt states she was sent to the ED by . Pt fearful of going to her home where father is at. Lots of stress at home. Believes that meds aren't working.    Estimated Length of Stay:  5-7days  Estimated Discharge Date: to be determined by physician    PATIENT STRENGTHS:  Patient Strengths:   Patient Strengths and Limitations:Limitations: Difficult relationships / poor social skills, Multiple barriers to leisure interests, Inappropriate/potentially harmful leisure interests, Difficulty problem solving/relies on others to help solve problems  Addictive Behavior: Addictive Behavior  In the Past 3 Months, Have You Felt or Has Someone Told You That You Have a Problem With  : None  Medical Problems:  Past Medical History:   Diagnosis Date    Anxiety     Bipolar disorder (HCC)     Cervical cancer (HCC)     Depression     Heroin abuse (HCC)     Neuropathy      Status EXAM:Mental Status and Behavioral Exam  Normal: No  Level of Assistance: Independent/Self  Facial Expression: Exaggerated  Affect: Blunt  Level of Consciousness: Alert  Frequency of Checks: 4 times per hour, close  Mood:Normal: No  Mood: Depressed, Anxious  Motor Activity:Normal: No  Motor Activity: Increased (rapid hand and body movements)  Eye Contact: Fair  Observed Behavior: Cooperative, Friendly  Sexual Misconduct History: Current - no  Preception: Pilot to person, Pilot to time, Pilot to place, Pilot to situation  Attention:Normal: No  Attention: Distractible  Thought Processes: Circumstantial  Thought Content:Normal: Yes  Depression Symptoms: Increased irritability  Anxiety Symptoms: Generalized  Holly Symptoms: No problems reported or

## 2025-01-29 NOTE — ED NOTES
RN in to introduce self. Denies any suicidal or homicidal ideations at this time. States that she is drowsy that she believes to \"from her meds\". States that her whole body feels wore out from situations at home with \"all the arguing\". RN informs pt she will let her rest and that if she needs anything to hit her call light. Verbalizes understanding.

## 2025-01-29 NOTE — ED NOTES
[facility] arrives. RN in to chart belongings. Pt empties coat pocket that contains pocket knife, cigarettes, a cell phone, home medication bottles, an inhaler, 3 vapes, THC products, and misc personal cosmetics. Those belongings, shoes, bra, and sweatshirt bagged and sent with [facility]. Pt leaves wearing pajama pants, t shirt, underwear, coat, and her own socks. Pockets are empty other than a few family pictures pt did not want damaged.

## 2025-01-29 NOTE — PLAN OF CARE
Problem: Anxiety  Goal: Will report anxiety at manageable levels  Description: INTERVENTIONS:  1. Administer medication as ordered  2. Teach and rehearse alternative coping skills  3. Provide emotional support with 1:1 interaction with staff  Outcome: Progressing   Patient reports high anxiety. She states that she has panic attacks constantly. She states that she is very worried about her swelling in her hands and feet. She spoke with internal medicine doctor and was informed of new orders for patient.     Problem: Coping  Goal: Pt/Family able to verbalize concerns and demonstrate effective coping strategies  Description: INTERVENTIONS:  1. Assist patient/family to identify coping skills, available support systems and cultural and spiritual values  2. Provide emotional support, including active listening and acknowledgement of concerns of patient and caregivers  3. Reduce environmental stimuli, as able  4. Instruct patient/family in relaxation techniques, as appropriate  5. Assess for spiritual pain/suffering and initiate Spiritual Care, Psychosocial Clinical Specialist consults as needed  Outcome: Progressing   Patient is not attending groups. She states that she needs to focus on her sleep due to everything that happened before admission. Denied suicidal ideations.

## 2025-01-29 NOTE — BH NOTE
Pt was approached at least 4 times throughout day to complete OQ Questionnaire, and offered assistance by RT related reading questions. Pt declined each time and did not complete OQ today.

## 2025-01-29 NOTE — ED NOTES
Pt sent with all necessary paperwork and charted belongings. Report given to Masood. Report called to ELIAZAR Rowell at [facility].

## 2025-01-29 NOTE — GROUP NOTE
Group Therapy Note    Date: 1/29/2025    Group Start Time: 1100  Group End Time: 1130  Group Topic: Cognitive Skills    Rhoda Barney CTRS        Group Therapy Note    Attendees: 5/16     Topic: To increase socialization, practice self expression, sharing preferences and   memories/experiences with peers and RT, relating to peers' ideas and memories in group.           Comments:   Patient did not participate in Cognitive Skills Group, at 1100, despite staff encouragement.   Pt was resting in room and seclusive to self when invited to group. RT provided accommodations  related to task as needed by participants.     Q15 minute safety checks maintained for patient safety and will continue to encourage   patient to attend unit programming.       Discipline Responsible: Psychoeducational Specialist   Signature: ROBB AHN

## 2025-01-29 NOTE — H&P
[facility] Internal Medicine  George Boogie MD; Horacio Roland MD, Franklin Cornell MD, Kateryna Rabago MD, Miguel Rock MD; Mason Walker MD    [facility] Internal Medicine   IN-PATIENT SERVICE   [facility]     HISTORY AND PHYSICAL EXAMINATION            Date:   1/29/2025  Patient name:  Irma Huizar  Date of admission:  1/29/2025  4:06 AM  MRN:   [id number]  Account:  [id number]  YOB: 1975  PCP:    No primary care provider on file.  Room:   [address] [place] [address]  Code Status:    No Order      Chief Complaint:   Right hand pain    History Obtained From:     Patient/EMR/bedside RN     History of Present Illness:     49-year-old  lady edentulous history of mental health disorder history of street drug abuse complaint of right hand pain and swelling denies any injury claims is going on for 3 weeks  is weaker due to swelling and pain in the joints  Patient does not have any history of rheumatoid arthritis no history of lupus no injuries    Past Medical History:     Past Medical History:   Diagnosis Date    Anxiety     Bipolar disorder (HCC)     Cervical cancer (HCC)     Depression     Heroin abuse (HCC)     Neuropathy         Past Surgical History:     Past Surgical History:   Procedure Laterality Date    CHOLECYSTECTOMY      LAPAROSCOPY          Medications Prior to Admission:     Prior to Admission medications    Medication Sig Start Date End Date Taking? Authorizing Provider   QUEtiapine (SEROQUEL XR) 50 MG extended release tablet Take 1 tablet by mouth nightly    ProviderIsamar MD   hydrOXYzine HCl (ATARAX) 50 MG tablet Take 1 tablet by mouth daily as needed for Anxiety    ProviderIsamar MD   lamoTRIgine (LAMICTAL) 25 MG tablet Take 2 tablets by mouth 2 times daily    ProviderIsamar MD   albuterol sulfate HFA (PROVENTIL;VENTOLIN;PROAIR) 108 (90 Base) MCG/ACT inhaler Inhale 2 puffs into the lungs every 6 hours as

## 2025-01-29 NOTE — H&P
Department of Psychiatry  Attending Physician Psychiatric Assessment   Patient: Irma Huizar  MRN: [id number]  Reason for Admission to Psychiatric Unit:  Threat to self requiring 24 hour professional observation  Threat to others requiring 24 hour professional observation  Acute disordered/bizarre behavior or psychomotor agitation or retardation;interferes with ADLs so that patient cannot function at a less intensive care level of care during evaluation and treatment   Concerns about patient's safety in the community  Past Mental Health Diagnosis: a history of  Bipolar Disorder, Personality Disorder, and Alcohol and/or Drug Use Disorder  Triggering event or precipitating factor: Housing instability, Financial instability, Alcohol/Drug Relapse, and Psych Treatment Noncompliance  Length of time/duration of triggering event: Months  Legal Status: Voluntary    CHIEF COMPLAINT: Acute psychosis    History obtained from: Patient, electronic medical record          HISTORY OF PRESENT ILLNESS:    Irma Huizar is a 49 y.o. female has significant psychiatric history of bipolar disorder with psychotic features, antisocial personality disorder and a history of mixed substance abuse.  Patient was recently discharged from [facility] in [facility] on 1/22/2025 for suicidal ideations and auditory hallucinations.  On chart review patient has had multiple psychiatric hospitalizations for similar reasons.  Patient is not compliant with outpatient psychiatry and psychotropic medications.  Was incarcerated in 2023 for 11 months and has been on parole since February 2024 and living with her parents.  Her psychiatric medication on discharge from [facility] include lamotrigine 25 mg and Seroquel 50 mg.  Patient has also been dispensed risperidone 1 mg last dispensed in September 2024.      Patient presented to [facility] ED with extreme anxiety and drowsiness which she believes to be \"from her meds\".  Patient was asked to come in ED

## 2025-01-29 NOTE — GROUP NOTE
Group Therapy Note    Date: 1/29/2025    Group Start Time: 1515  Group End Time: 1600  Group Topic: Recreational    STC[facility] Rhoda Wilson CTRS        Group Therapy Note    Attendees: 5/14     Topic: To increase socialization, practice self expression, and explore leisure interests   including music.      Comments:   Patient did not participate in Recreation Group, at 1515, despite staff encouragement.   Pt was resting in room and seclusive to self when invited to group. RT provided accommodations  related to task as needed by participants.     Q15 minute safety checks maintained for patient safety and will continue to encourage   patient to attend unit programming.       Discipline Responsible: Psychoeducational Specialist   Signature: ROBB AHN

## 2025-01-29 NOTE — BH NOTE
Patient Education - Tobacco Cessation    Patient given tobacco quitline number 67347349276 at this time, refusing to call at this time, states \" I just dont want to quit now\"- patient given information as to the dangers of long term tobacco use. Continue to reinforce the importance of tobacco cessation.

## 2025-01-29 NOTE — BH NOTE
[facility]  Admission Note     Admission Type:   Voluntary     Reason for admission:  Reason for Admission: Pt c/o being anxious. She was seen two weeks ago in ED for same. Pt states she was sent to the ED by . Pt fearful of going to her home where father is at. Lots of stress at home. Believes that meds aren't working.      Addictive Behavior:   Addictive Behavior  In the Past 3 Months, Have You Felt or Has Someone Told You That You Have a Problem With  : None    Medical Problems:   Past Medical History:   Diagnosis Date    Anxiety     Bipolar disorder (HCC)     Cervical cancer (HCC)     Depression     Heroin abuse (HCC)     Neuropathy        Status EXAM:  Mental Status and Behavioral Exam  Normal: No  Level of Assistance: Independent/Self  Facial Expression: Exaggerated  Affect: Blunt  Level of Consciousness: Alert  Frequency of Checks: 4 times per hour, close  Mood:Normal: No  Mood: Depressed, Anxious  Motor Activity:Normal: No  Motor Activity: Increased (rapid hand and body movements)  Eye Contact: Fair  Observed Behavior: Cooperative, Friendly  Sexual Misconduct History: Current - no  Preception: [place] to person, [place] to time, [place] to place, [place] to situation  Attention:Normal: No  Attention: Distractible  Thought Processes: Circumstantial  Thought Content:Normal: Yes  Depression Symptoms: Increased irritability  Anxiety Symptoms: Generalized  Holly Symptoms: No problems reported or observed.  Hallucinations: None  Delusions: No  Memory:Normal: Yes  Insight and Judgment: No  Insight and Judgment: Poor judgment, Poor insight    Tobacco Screening:  Practical Counseling, on admission, ewa X, if applicable and completed (first 3 are required if patient doesn't refuse):            ( ) Recognizing danger situations (included triggers and roadblocks)                    ( ) Coping skills (new ways to manage stress,relaxation techniques, changing routine, distraction)

## 2025-01-29 NOTE — BH NOTE
X-Ray on unit. Patient upset that the X-Ray is only ordered for R hand. She wanted both hands and feet to be x-rayed.

## 2025-01-30 LAB
ERYTHROCYTE [SEDIMENTATION RATE] IN BLOOD BY PHOTOMETRIC METHOD: 1 MM/HR (ref 0–20)
MICROORGANISM SPEC CULT: ABNORMAL
RHEUMATOID FACT SER NEPH-ACNC: 10 IU/ML (ref 0–13)
SPECIMEN DESCRIPTION: ABNORMAL

## 2025-01-30 PROCEDURE — 99232 SBSQ HOSP IP/OBS MODERATE 35: CPT | Performed by: PSYCHIATRY & NEUROLOGY

## 2025-01-30 PROCEDURE — 6370000000 HC RX 637 (ALT 250 FOR IP): Performed by: PSYCHIATRY & NEUROLOGY

## 2025-01-30 PROCEDURE — 99232 SBSQ HOSP IP/OBS MODERATE 35: CPT | Performed by: INTERNAL MEDICINE

## 2025-01-30 PROCEDURE — 86038 ANTINUCLEAR ANTIBODIES: CPT

## 2025-01-30 PROCEDURE — 6370000000 HC RX 637 (ALT 250 FOR IP): Performed by: INTERNAL MEDICINE

## 2025-01-30 PROCEDURE — 1240000000 HC EMOTIONAL WELLNESS R&B

## 2025-01-30 PROCEDURE — 36415 COLL VENOUS BLD VENIPUNCTURE: CPT

## 2025-01-30 PROCEDURE — 85652 RBC SED RATE AUTOMATED: CPT

## 2025-01-30 PROCEDURE — 86225 DNA ANTIBODY NATIVE: CPT

## 2025-01-30 PROCEDURE — 86431 RHEUMATOID FACTOR QUANT: CPT

## 2025-01-30 RX ORDER — NITROFURANTOIN 25; 75 MG/1; MG/1
100 CAPSULE ORAL EVERY 12 HOURS SCHEDULED
Status: COMPLETED | OUTPATIENT
Start: 2025-01-30 | End: 2025-02-04

## 2025-01-30 RX ORDER — LEVOFLOXACIN 500 MG/1
500 TABLET, FILM COATED ORAL DAILY
Status: DISCONTINUED | OUTPATIENT
Start: 2025-01-30 | End: 2025-01-30

## 2025-01-30 RX ADMIN — RISPERIDONE 1 MG: 1 TABLET, FILM COATED ORAL at 22:05

## 2025-01-30 RX ADMIN — IBUPROFEN 800 MG: 800 TABLET, FILM COATED ORAL at 22:05

## 2025-01-30 RX ADMIN — NITROFURANTOIN MONOHYDRATE/MACROCRYSTALS 100 MG: 25; 75 CAPSULE ORAL at 22:10

## 2025-01-30 RX ADMIN — HYDROXYZINE HYDROCHLORIDE 50 MG: 50 TABLET ORAL at 22:05

## 2025-01-30 RX ADMIN — RISPERIDONE 1 MG: 1 TABLET, FILM COATED ORAL at 08:46

## 2025-01-30 RX ADMIN — HYDROXYZINE HYDROCHLORIDE 50 MG: 50 TABLET ORAL at 08:46

## 2025-01-30 ASSESSMENT — PAIN DESCRIPTION - LOCATION: LOCATION: HAND

## 2025-01-30 ASSESSMENT — PAIN DESCRIPTION - ORIENTATION: ORIENTATION: RIGHT

## 2025-01-30 ASSESSMENT — LIFESTYLE VARIABLES
HOW OFTEN DO YOU HAVE A DRINK CONTAINING ALCOHOL: NEVER
HOW MANY STANDARD DRINKS CONTAINING ALCOHOL DO YOU HAVE ON A TYPICAL DAY: PATIENT DOES NOT DRINK

## 2025-01-30 ASSESSMENT — PAIN - FUNCTIONAL ASSESSMENT: PAIN_FUNCTIONAL_ASSESSMENT: ACTIVITIES ARE NOT PREVENTED

## 2025-01-30 ASSESSMENT — PAIN DESCRIPTION - DESCRIPTORS: DESCRIPTORS: ACHING

## 2025-01-30 ASSESSMENT — PAIN SCALES - GENERAL: PAINLEVEL_OUTOF10: 5

## 2025-01-30 NOTE — PLAN OF CARE
Problem: Anxiety  Goal: Will report anxiety at manageable levels  Description: INTERVENTIONS:  1. Administer medication as ordered  2. Teach and rehearse alternative coping skills  3. Provide emotional support with 1:1 interaction with staff  Outcome: Not Progressing     Problem: Coping  Goal: Pt/Family able to verbalize concerns and demonstrate effective coping strategies  Description: INTERVENTIONS:  1. Assist patient/family to identify coping skills, available support systems and cultural and spiritual values  2. Provide emotional support, including active listening and acknowledgement of concerns of patient and caregivers  3. Reduce environmental stimuli, as able  4. Instruct patient/family in relaxation techniques, as appropriate  5. Assess for spiritual pain/suffering and initiate Spiritual Care, Psychosocial Clinical Specialist consults as needed  Outcome: Not Progressing

## 2025-01-30 NOTE — GROUP NOTE
Group Therapy Note    Date: 1/30/2025    Group Start Time: 1430  Group End Time: 1510  Group Topic: Psychoeducation    Suzanna Crespo CTRS        Group Therapy Note    Attendees: 6/15    Psych-Ed/Relapse Prevention Group Note        Date: January 30, 2025 Start Time: 2:30pm  End Time: 3:10pm      Number of Participants in Group & Unit Census:  6/15    Topic:  interpersonal skills, gratitude, self-expression    Goal of Group: To improve interpersonal skills and gratitude awareness through collaborating with peers and demonstrating self-expression.      Comments:     Patient did not participate in Psych-Ed/Relapse Prevention group, despite staff encouragement and explanation of benefits.  Patient remain seclusive to self.  Q15 minute safety checks maintained for patient safety and will continue to encourage patient to attend unit programming.        Signature:  ROBB Shah

## 2025-01-30 NOTE — PLAN OF CARE
Problem: Anxiety  Goal: Will report anxiety at manageable levels  Description: INTERVENTIONS:  1. Administer medication as ordered  2. Teach and rehearse alternative coping skills  3. Provide emotional support with 1:1 interaction with staff  1/29/2025 2215 by Carmen Holland, RN  Outcome: Not Progressing     Problem: Coping  Goal: Pt/Family able to verbalize concerns and demonstrate effective coping strategies  Description: INTERVENTIONS:  1. Assist patient/family to identify coping skills, available support systems and cultural and spiritual values  2. Provide emotional support, including active listening and acknowledgement of concerns of patient and caregivers  3. Reduce environmental stimuli, as able  4. Instruct patient/family in relaxation techniques, as appropriate  5. Assess for spiritual pain/suffering and initiate Spiritual Care, Psychosocial Clinical Specialist consults as needed  1/29/2025 2215 by Carmen Holland, RN  Outcome: Progressing

## 2025-01-30 NOTE — GROUP NOTE
Group Therapy Note    Date: 1/30/2025    Group Start Time: 1010  Group End Time: 1045  Group Topic: Psychotherapy    Libby Romero MSW        Group Therapy Note    Attendees: 5/15     Patient was offered group therapy today but declined to participate despite encouragement from staff.  1:1 was offered.    Discipline Responsible: /Counselor      Signature:  NERY Saleh

## 2025-01-30 NOTE — GROUP NOTE
Group Therapy Note    Date: 1/30/2025    Group Start Time: 1100  Group End Time: 1145  Group Topic: Cognitive Skills    Suzanna Crespo CTRS        Group Therapy Note    Attendees: 4/15    Cognitive Skills Group Note        Date: January 30, 2025 Start Time: 11am  End Time: 11:45am      Number of Participants in Group & Unit Census:  4/15    Topic:  interpersonal skills, memory recall, self-expression    Goal of Group: To improve interpersonal skills and memory recall through collaborating with peers and demonstrating self-expression.      Comments:     Patient did not participate in Cognitive Skills group, despite staff encouragement and explanation of benefits.  Patient remain seclusive to self.  Q15 minute safety checks maintained for patient safety and will continue to encourage patient to attend unit programming.        Signature:  ROBB Shah

## 2025-01-31 PROCEDURE — 6370000000 HC RX 637 (ALT 250 FOR IP): Performed by: PSYCHIATRY & NEUROLOGY

## 2025-01-31 PROCEDURE — 6370000000 HC RX 637 (ALT 250 FOR IP): Performed by: INTERNAL MEDICINE

## 2025-01-31 PROCEDURE — 99232 SBSQ HOSP IP/OBS MODERATE 35: CPT | Performed by: INTERNAL MEDICINE

## 2025-01-31 PROCEDURE — 99232 SBSQ HOSP IP/OBS MODERATE 35: CPT | Performed by: PSYCHIATRY & NEUROLOGY

## 2025-01-31 PROCEDURE — 1240000000 HC EMOTIONAL WELLNESS R&B

## 2025-01-31 RX ORDER — PREDNISONE 20 MG/1
20 TABLET ORAL DAILY
Status: COMPLETED | OUTPATIENT
Start: 2025-01-31 | End: 2025-02-04

## 2025-01-31 RX ADMIN — NITROFURANTOIN MONOHYDRATE/MACROCRYSTALS 100 MG: 25; 75 CAPSULE ORAL at 08:13

## 2025-01-31 RX ADMIN — IBUPROFEN 800 MG: 800 TABLET, FILM COATED ORAL at 20:56

## 2025-01-31 RX ADMIN — ACETAMINOPHEN 650 MG: 325 TABLET ORAL at 08:16

## 2025-01-31 RX ADMIN — RISPERIDONE 1 MG: 1 TABLET, FILM COATED ORAL at 20:56

## 2025-01-31 RX ADMIN — NICOTINE POLACRILEX 2 MG: 2 LOZENGE ORAL at 16:20

## 2025-01-31 RX ADMIN — HYDROXYZINE HYDROCHLORIDE 50 MG: 50 TABLET ORAL at 20:56

## 2025-01-31 RX ADMIN — PREDNISONE 20 MG: 20 TABLET ORAL at 10:22

## 2025-01-31 RX ADMIN — RISPERIDONE 1 MG: 1 TABLET, FILM COATED ORAL at 08:13

## 2025-01-31 RX ADMIN — TRAZODONE HYDROCHLORIDE 50 MG: 50 TABLET ORAL at 20:56

## 2025-01-31 RX ADMIN — NITROFURANTOIN MONOHYDRATE/MACROCRYSTALS 100 MG: 25; 75 CAPSULE ORAL at 20:56

## 2025-01-31 ASSESSMENT — PAIN DESCRIPTION - ORIENTATION
ORIENTATION: RIGHT;LEFT

## 2025-01-31 ASSESSMENT — PAIN SCALES - GENERAL
PAINLEVEL_OUTOF10: 5

## 2025-01-31 ASSESSMENT — PAIN DESCRIPTION - LOCATION
LOCATION: HAND

## 2025-01-31 ASSESSMENT — PAIN DESCRIPTION - DESCRIPTORS
DESCRIPTORS: ACHING
DESCRIPTORS: DISCOMFORT

## 2025-01-31 ASSESSMENT — PAIN - FUNCTIONAL ASSESSMENT
PAIN_FUNCTIONAL_ASSESSMENT: ACTIVITIES ARE NOT PREVENTED
PAIN_FUNCTIONAL_ASSESSMENT: ACTIVITIES ARE NOT PREVENTED

## 2025-01-31 NOTE — PLAN OF CARE
Problem: Anxiety  Goal: Will report anxiety at manageable levels  Description: INTERVENTIONS:  1. Administer medication as ordered  2. Teach and rehearse alternative coping skills  3. Provide emotional support with 1:1 interaction with staff  1/31/2025 1053 by Emil Boss RN  Outcome: Progressing  Flowsheets (Taken 1/31/2025 0830)  Will report anxiety at manageable levels: Administer medication as ordered  Note: Patient is complaining of anxiety at this time. Stating that they feel restless and are having trouble sleeping and calming down in order to rest this evening.     Problem: Coping  Goal: Pt/Family able to verbalize concerns and demonstrate effective coping strategies  Description: INTERVENTIONS:  1. Assist patient/family to identify coping skills, available support systems and cultural and spiritual values  2. Provide emotional support, including active listening and acknowledgement of concerns of patient and caregivers  3. Reduce environmental stimuli, as able  4. Instruct patient/family in relaxation techniques, as appropriate  5. Assess for spiritual pain/suffering and initiate Spiritual Care, Psychosocial Clinical Specialist consults as needed  1/31/2025 1053 by Emil Boss RN  Outcome: Progressing  Flowsheets  Taken 1/31/2025 1053  Patient/family able to verbalize anxieties, fears, and concerns, and demonstrate effective coping:   Assist patient/family to identify coping skills, available support systems and cultural and spiritual values   Provide emotional support, including active listening and acknowledgement of concerns of patient and caregivers  Taken 1/31/2025 0830  Patient/family able to verbalize anxieties, fears, and concerns, and demonstrate effective coping: Assist patient/family to identify coping skills, available support systems and cultural and spiritual values      Patient is tearful with assessment stating she does not want to die. Patient verbalizes family

## 2025-01-31 NOTE — GROUP NOTE
Group Therapy Note    Date: 1/31/2025    Group Start Time: 1330  Group End Time: 1410  Group Topic: Cognitive Skills    Suzanna Crespo CTRS        Group Therapy Note    Attendees: 3/12    Cognitive Skills Group Note        Date: January 31, 2025 Start Time: 1:30pm  End Time: 2:10pm      Number of Participants in Group & Unit Census:  3/12    Topic:  interpersonal skills, decision-making, concentration     Goal of Group: To improve interpersonal skills and decision-making through collaborating with peers and concentrating on a presented task.       Comments:     Patient did not participate in Cognitive Skills group, despite staff encouragement and explanation of benefits.  Patient remain seclusive to self.  Q15 minute safety checks maintained for patient safety and will continue to encourage patient to attend unit programming.        Signature:  ROBB Shah

## 2025-01-31 NOTE — GROUP NOTE
Group Therapy Note    Date: 1/31/2025    Group Start Time: 1100  Group End Time: 1140  Group Topic: Recreational    [facility] Suzanna Silva CTRS        Group Therapy Note    Attendees: 2/12    Recreation Group Note        Date: January 31, 2025     Start Time: 11am  End Time: 11:40am      Number of Participants in Group & Unit Census:  2/12    Topic:  interpersonal skills, leisure awareness, creative expression    Goal of Group: To improve interpersonal skills and leisure awareness through collaborating with peers and demonstrating creative expression.      Comments:     Patient did not participate in Recreation group, despite staff encouragement and explanation of benefits.  Patient remain seclusive to self.  Q15 minute safety checks maintained for patient safety and will continue to encourage patient to attend unit programming.        Signature:  ROBB Shah

## 2025-01-31 NOTE — PLAN OF CARE
Problem: Anxiety  Goal: Will report anxiety at manageable levels  Description: INTERVENTIONS:  1. Administer medication as ordered  2. Teach and rehearse alternative coping skills  3. Provide emotional support with 1:1 interaction with staff  1/30/2025 2324 by Ellyn Ceron, RN  Outcome: Progressing     Problem: Coping  Goal: Pt/Family able to verbalize concerns and demonstrate effective coping strategies  Description: INTERVENTIONS:  1. Assist patient/family to identify coping skills, available support systems and cultural and spiritual values  2. Provide emotional support, including active listening and acknowledgement of concerns of patient and caregivers  3. Reduce environmental stimuli, as able  4. Instruct patient/family in relaxation techniques, as appropriate  5. Assess for spiritual pain/suffering and initiate Spiritual Care, Psychosocial Clinical Specialist consults as needed  1/30/2025 2324 by Ellyn Ceron RN  Outcome: Progressing     Patient endorses feelings of anxiety at this time rating as a 7 out of 10. Patient does not endorse good coping skills at this time. Patient is isolative to room, cooperative, blunt and flat. Patient has remained [facility] at this time. [id number] checks implemented per protocol.

## 2025-01-31 NOTE — BH NOTE
[facility]  Day 3 Interdisciplinary Treatment Plan NOTE    Review Date & Time: 1/31/25 12:45pm    Admission Type:   Admission Type: Voluntary    Reason for admission:  Reason for Admission: Pt c/o being anxious. She was seen two weeks ago in ED for same. Pt states she was sent to the ED by . Pt fearful of going to her home where father is at. Lots of stress at home. Believes that meds aren't working.  Estimated Length of Stay: 5-7 days  Estimated Discharge Date Update: to be determined by physician    PATIENT STRENGTHS:  Patient Strengths    Patient Strengths and Limitations:Limitations: Difficult relationships / poor social skills, Multiple barriers to leisure interests, Inappropriate/potentially harmful leisure interests, Difficulty problem solving/relies on others to help solve problems  Addictive Behavior:Addictive Behavior  In the Past 3 Months, Have You Felt or Has Someone Told You That You Have a Problem With  : None  Medical Problems:  Past Medical History:   Diagnosis Date    Anxiety     Bipolar disorder (HCC)     Cervical cancer (HCC)     Depression     Heroin abuse (HCC)     Neuropathy        Risk:  Fall Risk   Noé Scale Noé Scale Score: 21  BVC    Change in scores no Changes to plan of Care no    Status EXAM:   Mental Status and Behavioral Exam  Normal: No  Level of Assistance: Independent/Self  Facial Expression: Exaggerated  Affect: Blunt  Level of Consciousness: Alert  Frequency of Checks: 4 times per hour, close  Mood:Normal: No  Mood: Depressed, Anxious  Motor Activity:Normal: No  Motor Activity: Increased  Eye Contact: Fair  Observed Behavior: Cooperative  Sexual Misconduct History: Current - no  Preception: [place] to person, [place] to time, [place] to place, [place] to situation  Attention:Normal: No  Attention: Distractible  Thought Processes: Circumstantial  Thought Content:Normal: Yes  Depression Symptoms: Appetite change  Anxiety Symptoms: Generalized  Holly

## 2025-02-01 PROCEDURE — 6370000000 HC RX 637 (ALT 250 FOR IP): Performed by: PSYCHIATRY & NEUROLOGY

## 2025-02-01 PROCEDURE — 6370000000 HC RX 637 (ALT 250 FOR IP): Performed by: INTERNAL MEDICINE

## 2025-02-01 PROCEDURE — 99232 SBSQ HOSP IP/OBS MODERATE 35: CPT | Performed by: INTERNAL MEDICINE

## 2025-02-01 PROCEDURE — 99232 SBSQ HOSP IP/OBS MODERATE 35: CPT | Performed by: NURSE PRACTITIONER

## 2025-02-01 PROCEDURE — 1240000000 HC EMOTIONAL WELLNESS R&B

## 2025-02-01 RX ADMIN — NITROFURANTOIN MONOHYDRATE/MACROCRYSTALS 100 MG: 25; 75 CAPSULE ORAL at 20:50

## 2025-02-01 RX ADMIN — RISPERIDONE 1 MG: 1 TABLET, FILM COATED ORAL at 20:50

## 2025-02-01 RX ADMIN — ACETAMINOPHEN 650 MG: 325 TABLET ORAL at 15:56

## 2025-02-01 RX ADMIN — TRAZODONE HYDROCHLORIDE 50 MG: 50 TABLET ORAL at 20:50

## 2025-02-01 RX ADMIN — NICOTINE POLACRILEX 2 MG: 2 LOZENGE ORAL at 13:43

## 2025-02-01 RX ADMIN — RISPERIDONE 1 MG: 1 TABLET, FILM COATED ORAL at 09:09

## 2025-02-01 RX ADMIN — NICOTINE POLACRILEX 2 MG: 2 LOZENGE ORAL at 15:56

## 2025-02-01 RX ADMIN — HYDROXYZINE HYDROCHLORIDE 50 MG: 50 TABLET ORAL at 15:56

## 2025-02-01 RX ADMIN — NITROFURANTOIN MONOHYDRATE/MACROCRYSTALS 100 MG: 25; 75 CAPSULE ORAL at 09:09

## 2025-02-01 RX ADMIN — PREDNISONE 20 MG: 20 TABLET ORAL at 09:09

## 2025-02-01 ASSESSMENT — PAIN SCALES - GENERAL: PAINLEVEL_OUTOF10: 7

## 2025-02-01 ASSESSMENT — LIFESTYLE VARIABLES: HOW OFTEN DO YOU HAVE A DRINK CONTAINING ALCOHOL: NEVER

## 2025-02-01 NOTE — PLAN OF CARE
Problem: Coping  Goal: Pt/Family able to verbalize concerns and demonstrate effective coping strategies  Description: INTERVENTIONS:  1. Assist patient/family to identify coping skills, available support systems and cultural and spiritual values  2. Provide emotional support, including active listening and acknowledgement of concerns of patient and caregivers  3. Reduce environmental stimuli, as able  4. Instruct patient/family in relaxation techniques, as appropriate  5. Assess for spiritual pain/suffering and initiate Spiritual Care, Psychosocial Clinical Specialist consults as needed  1/31/2025 2042 by Carmen Holland RN  Outcome: Progressing     Problem: Pain  Goal: Verbalizes/displays adequate comfort level or baseline comfort level  1/31/2025 2042 by Carmen Holland RN  Outcome: Progressing

## 2025-02-01 NOTE — GROUP NOTE
Group Therapy Note    Date: 2/1/2025    Group Start Time: 1330  Group End Time: 1415  Group Topic: Cognitive Skills    Cassidy Obregon CTRS    Cognitive Skills Group Note        Date: February 1, 2025 Start Time: 1:30pm  End Time:  215pm      Number of Participants in Group & Unit Census:  3/11    Topic: cognitive skills     Goal of Group:pt will demonstrate improved coping skills and improved interpersonal relationships      Comments:     Patient did not participate in Cognitive Skills group, despite staff encouragement and explanation of benefits.  Patient remain seclusive to self.  Q15 minute safety checks maintained for patient safety and will continue to encourage patient to attend unit programming.              Signature:  ROBB DUNN

## 2025-02-01 NOTE — GROUP NOTE
Community Meeting Group Note        Date: February 1, 2025 Start Time: 9am  End Time: 930am      Number of Participants in Group & Unit Census:  2/12    Topic: Community meeting/goals    Goal of Group:Go over goals for the day       Comments:     Patient did not participate in Community Meeting group, despite staff encouragement and explanation of benefits.  Patient remain seclusive to self.  Q15 minute safety checks maintained for patient safety and will continue to encourage patient to attend unit programming.

## 2025-02-01 NOTE — GROUP NOTE
Group Therapy Note    Date: 2/1/2025    Group Start Time: 1030  Group End Time: 1055  Group Topic: Psychotherapy    Libby Romero MSW        Group Therapy Note    Attendees: 2/12     Patient was offered group therapy today but declined to participate despite encouragement from staff.  1:1 was offered.    Discipline Responsible: /Counselor      Signature:  NERY Saleh

## 2025-02-01 NOTE — PLAN OF CARE
Problem: Anxiety  Goal: Will report anxiety at manageable levels  Description: INTERVENTIONS:  1. Administer medication as ordered  2. Teach and rehearse alternative coping skills  3. Provide emotional support with 1:1 interaction with staff  Outcome: Progressing  Note: Patient denies any current suicidal thoughts and agrees to seek out staff if thoughts arise. Depressed and anxious. Has been in bed all morning, out for needs. Minimal during talk time. Denies hallucinations. Patient compliant with medications and staff.

## 2025-02-01 NOTE — GROUP NOTE
Group Therapy Note    Date: 2025    Group Start Time: 1330  Group End Time: 1415  Group Topic: Cognitive Skills    Cassidy Obregon CTRS           Patient's Goal:  ***    Notes:  ***    Status After Intervention:  {Status After Intervention:482541904}    Participation Level: {Participation Level:633148229}    Participation Quality: {[facility] PARTICIPATION QUALITY:123465606}      Speech:  {ED  CD_SPEECH:27431}      Thought Process/Content: {Thought Process/Content:926436414}      Affective Functioning: {Affective Functionin}      Mood: {Mood:317069167}      Level of consciousness:  {Level of consciousness:216131235}      Response to Learning: {[facility] Responses to Learnin}      Endings: {[facility] Endings:65339}    Modes of Intervention: {[facility] Modes of Intervention:515763103}      Discipline Responsible: {[facility] Multidisciplinary:367868946}      Signature:  ROBB DUNN

## 2025-02-02 PROCEDURE — 1240000000 HC EMOTIONAL WELLNESS R&B

## 2025-02-02 PROCEDURE — 6370000000 HC RX 637 (ALT 250 FOR IP): Performed by: PSYCHIATRY & NEUROLOGY

## 2025-02-02 PROCEDURE — 6370000000 HC RX 637 (ALT 250 FOR IP): Performed by: INTERNAL MEDICINE

## 2025-02-02 PROCEDURE — 99232 SBSQ HOSP IP/OBS MODERATE 35: CPT | Performed by: NURSE PRACTITIONER

## 2025-02-02 PROCEDURE — 99232 SBSQ HOSP IP/OBS MODERATE 35: CPT | Performed by: INTERNAL MEDICINE

## 2025-02-02 RX ADMIN — RISPERIDONE 1 MG: 1 TABLET, FILM COATED ORAL at 09:18

## 2025-02-02 RX ADMIN — NITROFURANTOIN MONOHYDRATE/MACROCRYSTALS 100 MG: 25; 75 CAPSULE ORAL at 20:49

## 2025-02-02 RX ADMIN — NITROFURANTOIN MONOHYDRATE/MACROCRYSTALS 100 MG: 25; 75 CAPSULE ORAL at 09:18

## 2025-02-02 RX ADMIN — HYDROXYZINE HYDROCHLORIDE 50 MG: 50 TABLET ORAL at 19:14

## 2025-02-02 RX ADMIN — NICOTINE POLACRILEX 2 MG: 2 LOZENGE ORAL at 19:14

## 2025-02-02 RX ADMIN — ACETAMINOPHEN 650 MG: 325 TABLET ORAL at 19:38

## 2025-02-02 RX ADMIN — PREDNISONE 20 MG: 20 TABLET ORAL at 09:18

## 2025-02-02 RX ADMIN — RISPERIDONE 1 MG: 1 TABLET, FILM COATED ORAL at 20:48

## 2025-02-02 RX ADMIN — NICOTINE POLACRILEX 2 MG: 2 LOZENGE ORAL at 15:17

## 2025-02-02 RX ADMIN — TRAZODONE HYDROCHLORIDE 50 MG: 50 TABLET ORAL at 20:49

## 2025-02-02 RX ADMIN — NICOTINE POLACRILEX 2 MG: 2 LOZENGE ORAL at 10:32

## 2025-02-02 ASSESSMENT — PAIN DESCRIPTION - LOCATION: LOCATION: HEAD

## 2025-02-02 ASSESSMENT — PAIN SCALES - GENERAL: PAINLEVEL_OUTOF10: 9

## 2025-02-02 NOTE — GROUP NOTE
Group Therapy Note    Date: 2/2/2025    Group Start Time: 1030  Group End Time: 1120  Group Topic: Cognitive Skills    [facility] Cassidy Ruano CTRS        Group Therapy Note    Attendees: 4/13       Patient's Goal:  pt will demonstrate improved coping skills and improved interpersonal relationships    Notes:   pt was pleasant and participated well    Status After Intervention:  Improved    Participation Level: Active Listener and Interactive    Participation Quality: Appropriate, Sharing, and Supportive      Speech:  normal      Thought Process/Content: Logical      Affective Functioning: Congruent      Mood: euthymic      Level of consciousness:  Alert      Response to Learning: Able to verbalize current knowledge/experience, Capable of insight, and Progressing to goal      Endings: None Reported    Modes of Intervention: Education, Support, and Activity      Discipline Responsible: Psychoeducational Specialist      Signature:  ROBB DUNN

## 2025-02-02 NOTE — PLAN OF CARE
Problem: Anxiety  Goal: Will report anxiety at manageable levels  Description: INTERVENTIONS:  1. Administer medication as ordered  2. Teach and rehearse alternative coping skills  3. Provide emotional support with 1:1 interaction with staff  2/1/2025 2106 by Duyen Watkins LPN  Outcome: Progressing  Note: Patient is isolative to room resting throughout evening. Patient is pleasant/cooperative upon approach, medication compliant and behavior controlled. Patient endorses she continues to have anxiety and she presents with a flight of ideas during 1:1 talk time. Patient is encouraged to participate in programming to explore coping skills, patient verbalized understanding.     Problem: Pain  Goal: Verbalizes/displays adequate comfort level or baseline comfort level  Outcome: Progressing  Note: Patient denies pain at time of assessment. Patient educated on 0-10 pain scale and as needed medications.

## 2025-02-02 NOTE — GROUP NOTE
Group Therapy Note    Date: 2/2/2025    Group Start Time: 0800  Group End Time: 0830  Group Topic: Orientation Group    Umang Vora RN        Group Therapy Note    Group Therapy Note     Date: 2/2/25     Group Start Time: 0800  Group End Time: 0830  Group Topic: Orientation Group     NILAY Sagastume RN           Group Therapy Note     Attendees: 10/13     Patient attended morning orientation group and actively listened while educated on unit policies, expectations, and orientation. Patient was reminded of group times, location of white board, today's staff, shower location/time, group time limitations at nurses station, phone and tv times. Patient educated on discharge process and planning as well as informed that all patient's are seen by a MD or NP every day.                   Signature:  UMANG TONG RN

## 2025-02-03 PROCEDURE — 1240000000 HC EMOTIONAL WELLNESS R&B

## 2025-02-03 PROCEDURE — 6370000000 HC RX 637 (ALT 250 FOR IP): Performed by: PSYCHIATRY & NEUROLOGY

## 2025-02-03 PROCEDURE — 99232 SBSQ HOSP IP/OBS MODERATE 35: CPT | Performed by: INTERNAL MEDICINE

## 2025-02-03 PROCEDURE — 6370000000 HC RX 637 (ALT 250 FOR IP): Performed by: INTERNAL MEDICINE

## 2025-02-03 PROCEDURE — 99232 SBSQ HOSP IP/OBS MODERATE 35: CPT | Performed by: PSYCHIATRY & NEUROLOGY

## 2025-02-03 RX ORDER — IBUPROFEN 400 MG/1
400 TABLET, FILM COATED ORAL EVERY 6 HOURS PRN
Status: DISCONTINUED | OUTPATIENT
Start: 2025-02-03 | End: 2025-02-04 | Stop reason: HOSPADM

## 2025-02-03 RX ADMIN — TRAZODONE HYDROCHLORIDE 50 MG: 50 TABLET ORAL at 21:35

## 2025-02-03 RX ADMIN — NITROFURANTOIN MONOHYDRATE/MACROCRYSTALS 100 MG: 25; 75 CAPSULE ORAL at 08:40

## 2025-02-03 RX ADMIN — NICOTINE POLACRILEX 2 MG: 2 LOZENGE ORAL at 16:08

## 2025-02-03 RX ADMIN — RISPERIDONE 1 MG: 1 TABLET, FILM COATED ORAL at 21:35

## 2025-02-03 RX ADMIN — RISPERIDONE 1 MG: 1 TABLET, FILM COATED ORAL at 08:41

## 2025-02-03 RX ADMIN — ACETAMINOPHEN 650 MG: 325 TABLET ORAL at 08:41

## 2025-02-03 RX ADMIN — NICOTINE POLACRILEX 2 MG: 2 LOZENGE ORAL at 18:12

## 2025-02-03 RX ADMIN — HYDROXYZINE HYDROCHLORIDE 50 MG: 50 TABLET ORAL at 21:35

## 2025-02-03 RX ADMIN — NICOTINE POLACRILEX 2 MG: 2 LOZENGE ORAL at 20:11

## 2025-02-03 RX ADMIN — AMITRIPTYLINE HYDROCHLORIDE 25 MG: 25 TABLET ORAL at 21:35

## 2025-02-03 RX ADMIN — HYDROXYZINE HYDROCHLORIDE 50 MG: 50 TABLET ORAL at 08:40

## 2025-02-03 RX ADMIN — PREDNISONE 20 MG: 20 TABLET ORAL at 08:40

## 2025-02-03 RX ADMIN — NICOTINE POLACRILEX 2 MG: 2 LOZENGE ORAL at 08:40

## 2025-02-03 RX ADMIN — NICOTINE POLACRILEX 2 MG: 2 LOZENGE ORAL at 12:59

## 2025-02-03 RX ADMIN — IBUPROFEN 400 MG: 400 TABLET, FILM COATED ORAL at 17:37

## 2025-02-03 RX ADMIN — NITROFURANTOIN MONOHYDRATE/MACROCRYSTALS 100 MG: 25; 75 CAPSULE ORAL at 21:35

## 2025-02-03 ASSESSMENT — PAIN SCALES - GENERAL
PAINLEVEL_OUTOF10: 1
PAINLEVEL_OUTOF10: 3
PAINLEVEL_OUTOF10: 10

## 2025-02-03 ASSESSMENT — PAIN DESCRIPTION - LOCATION
LOCATION: NECK
LOCATION: NECK

## 2025-02-03 NOTE — PLAN OF CARE
Problem: Coping  Goal: Pt/Family able to verbalize concerns and demonstrate effective coping strategies  Description: INTERVENTIONS:  1. Assist patient/family to identify coping skills, available support systems and cultural and spiritual values  2. Provide emotional support, including active listening and acknowledgement of concerns of patient and caregivers  3. Reduce environmental stimuli, as able  4. Instruct patient/family in relaxation techniques, as appropriate  5. Assess for spiritual pain/suffering and initiate Spiritual Care, Psychosocial Clinical Specialist consults as needed  2/2/2025 2100 by Carmen Holland RN  Outcome: Progressing     Problem: Pain  Goal: Verbalizes/displays adequate comfort level or baseline comfort level  2/2/2025 2100 by Carmen Holland RN  Outcome: Progressing

## 2025-02-03 NOTE — GROUP NOTE
Group Therapy Note    Date: 2/3/2025    Group Start Time: 0800  Group End Time: 0830  Group Topic: Orientation Group    Umang Vora, RN        Group Therapy Note    Attendees: 10/14          Patient attended morning orientation group and actively listened while educated on unit policies, expectations, and orientation. Patient was reminded of group times, location of white board, today's staff, shower location/time, group time limitations at nurses station, phone and tv times. Patient educated on discharge process and planning as well as informed that all patient's are seen by a MD or NP every day.         Signature:  UMANG TONG RN

## 2025-02-03 NOTE — GROUP NOTE
Group Therapy Note    Date: 2/3/2025    Group Start Time: 0930  Group End Time: 0952  Group Topic: Nursing    Odalis Shah RN        Group Therapy Note    Attendees: 4/14       Patient's Goal:  Per patient \" Discharge planning\"    Notes:  Patient encouraged to attend discharge planning group which is now held with night shift.     Status After Intervention:  Improved    Participation Level: Active Listener    Participation Quality: Appropriate, Attentive, and Sharing      Speech:  normal      Thought Process/Content: Linear      Affective Functioning: Congruent      Mood:  stable      Level of consciousness:  Alert and Attentive      Response to Learning: Progressing to goal      Endings: None Reported    Modes of Intervention: Support, Socialization, and Exploration      Discipline Responsible: Registered Nurse      Signature:  Odalis Palafox RN

## 2025-02-03 NOTE — PLAN OF CARE
Problem: Anxiety  Goal: Will report anxiety at manageable levels  Description: INTERVENTIONS:  1. Administer medication as ordered  2. Teach and rehearse alternative coping skills  3. Provide emotional support with 1:1 interaction with staff  Outcome: Progressing     Problem: Coping  Goal: Pt/Family able to verbalize concerns and demonstrate effective coping strategies  Description: INTERVENTIONS:  1. Assist patient/family to identify coping skills, available support systems and cultural and spiritual values  2. Provide emotional support, including active listening and acknowledgement of concerns of patient and caregivers  3. Reduce environmental stimuli, as able  4. Instruct patient/family in relaxation techniques, as appropriate  5. Assess for spiritual pain/suffering and initiate Spiritual Care, Psychosocial Clinical Specialist consults as needed  Outcome: Progressing     Problem: Pain  Goal: Verbalizes/displays adequate comfort level or baseline comfort level  Outcome: Progressing     Patient is open to 1:1 talk time with staff. Patient reports experiencing a lot of anxiety today. Patient denies the presence of any depression, suicidal ideations, homicidal ideations, auditory hallucinations, and/or visual hallucinations. Patient is out and social with peers in the day area and attends group therapy session. Patient eats her meals and is compliant with her prescribed medications.

## 2025-02-03 NOTE — GROUP NOTE
Group Therapy Note    Date: 2/3/2025    Group Start Time: 1440  Group End Time: 1520  Group Topic: Nursing    Umang Vora, RN        Group Therapy Note    Attendees: 3/14       Notes:  Patient came to coping skills group and actively listened and participated. Group went through A-Z coping skills.     Status After Intervention:  Unchanged    Participation Level: Active Listener and Interactive    Participation Quality: Appropriate, Attentive, Sharing, and Supportive      Speech:  normal      Thought Process/Content: Logical  Linear      Affective Functioning: Congruent      Mood: anxious      Level of consciousness:  Oriented x4      Response to Learning: Able to verbalize current knowledge/experience, Able to verbalize/acknowledge new learning, Capable of insight, and Progressing to goal      Endings: None Reported    Modes of Intervention: Education, Support, Socialization, and Problem-solving      Discipline Responsible: Registered Nurse      Signature:  UMANG TONG RN

## 2025-02-03 NOTE — GROUP NOTE
Group Therapy Note    Date: 2/3/2025    Group Start Time: 1030  Group End Time: 1115  Group Topic: Music Therapy    Kelvin Lynch        Group Therapy Note    Attendees: 4/14       Patient's Goal:  Patients shared music analyzing lyrics for themes, sharing advice based on themes within song selections. Patient goals to engage in peer support; Increase self-expression; Demonstrate positive use of time; Increase sense of community    Notes:  Patient attended and participated in group having positive interactions with peers and staff throughout. Patient shared music and advice, and was supportive towards peers sharing and advice. Was pleasant and engaging throughout    Status After Intervention:  Improved    Participation Level: Active Listener and Interactive    Participation Quality: Appropriate, Attentive, and Sharing      Speech:  normal      Thought Process/Content: Logical  Linear      Affective Functioning: Congruent      Mood: euthymic      Level of consciousness:  Alert and Attentive      Response to Learning: Able to verbalize current knowledge/experience and Progressing to goal      Endings: None Reported    Modes of Intervention: Support, Socialization, Exploration, Activity, Media, and Reality-testing      Discipline Responsible: Psychoeducational Specialist      Signature:  Kelvin Arechiga

## 2025-02-03 NOTE — GROUP NOTE
Group Therapy Note    Date: 2/2/2025    Group Start Time: 2000  Group End Time: 2030  Group Topic: Discharge Planning    Romy Guillermo RN       Patient's Goal:  To familiarize patient with the discharge process    Notes:  Patient participated appropriately    Status After Intervention:  Improved    Participation Level: Active Listener and Interactive    Participation Quality: Appropriate      Speech:  normal      Thought Process/Content: Logical      Affective Functioning: Congruent      Mood:  calm      Level of consciousness:  Alert and Oriented x4      Response to Learning: Able to verbalize current knowledge/experience and Able to verbalize/acknowledge new learning      Endings: None Reported    Modes of Intervention: Education and Support      Discipline Responsible: Registered Nurse      Signature:  Romy Holder RN

## 2025-02-04 VITALS
HEART RATE: 73 BPM | TEMPERATURE: 97.8 F | DIASTOLIC BLOOD PRESSURE: 82 MMHG | SYSTOLIC BLOOD PRESSURE: 119 MMHG | BODY MASS INDEX: 20.73 KG/M2 | OXYGEN SATURATION: 97 % | RESPIRATION RATE: 14 BRPM | HEIGHT: 69 IN | WEIGHT: 140 LBS

## 2025-02-04 LAB
ANA SER QL IA: NEGATIVE
DSDNA IGG SER QL IA: 1.5 IU/ML
NUCLEAR IGG SER IA-RTO: 0.1 U/ML

## 2025-02-04 PROCEDURE — 99239 HOSP IP/OBS DSCHRG MGMT >30: CPT | Performed by: PSYCHIATRY & NEUROLOGY

## 2025-02-04 PROCEDURE — 99232 SBSQ HOSP IP/OBS MODERATE 35: CPT | Performed by: INTERNAL MEDICINE

## 2025-02-04 PROCEDURE — 6370000000 HC RX 637 (ALT 250 FOR IP): Performed by: INTERNAL MEDICINE

## 2025-02-04 PROCEDURE — 6370000000 HC RX 637 (ALT 250 FOR IP): Performed by: PSYCHIATRY & NEUROLOGY

## 2025-02-04 RX ORDER — TRAZODONE HYDROCHLORIDE 50 MG/1
50 TABLET, FILM COATED ORAL NIGHTLY PRN
Qty: 30 TABLET | Refills: 0 | Status: SHIPPED | OUTPATIENT
Start: 2025-02-04

## 2025-02-04 RX ORDER — POLYETHYLENE GLYCOL 3350 17 G
2 POWDER IN PACKET (EA) ORAL
Qty: 100 EACH | Refills: 3 | Status: SHIPPED | OUTPATIENT
Start: 2025-02-04

## 2025-02-04 RX ORDER — RISPERIDONE 1 MG/1
1 TABLET ORAL 2 TIMES DAILY
Qty: 60 TABLET | Refills: 2 | Status: SHIPPED | OUTPATIENT
Start: 2025-02-04

## 2025-02-04 RX ADMIN — NITROFURANTOIN MONOHYDRATE/MACROCRYSTALS 100 MG: 25; 75 CAPSULE ORAL at 08:03

## 2025-02-04 RX ADMIN — NICOTINE POLACRILEX 2 MG: 2 LOZENGE ORAL at 12:40

## 2025-02-04 RX ADMIN — NICOTINE POLACRILEX 2 MG: 2 LOZENGE ORAL at 08:59

## 2025-02-04 RX ADMIN — HYDROXYZINE HYDROCHLORIDE 50 MG: 50 TABLET ORAL at 08:02

## 2025-02-04 RX ADMIN — PREDNISONE 20 MG: 20 TABLET ORAL at 07:58

## 2025-02-04 RX ADMIN — RISPERIDONE 1 MG: 1 TABLET, FILM COATED ORAL at 07:58

## 2025-02-04 RX ADMIN — IBUPROFEN 400 MG: 400 TABLET, FILM COATED ORAL at 14:29

## 2025-02-04 ASSESSMENT — PAIN - FUNCTIONAL ASSESSMENT: PAIN_FUNCTIONAL_ASSESSMENT: ACTIVITIES ARE NOT PREVENTED

## 2025-02-04 ASSESSMENT — PAIN DESCRIPTION - DESCRIPTORS: DESCRIPTORS: THROBBING

## 2025-02-04 ASSESSMENT — PAIN DESCRIPTION - LOCATION: LOCATION: NECK;FOOT

## 2025-02-04 ASSESSMENT — PAIN DESCRIPTION - ORIENTATION: ORIENTATION: RIGHT;LEFT

## 2025-02-04 ASSESSMENT — PAIN SCALES - GENERAL: PAINLEVEL_OUTOF10: 8

## 2025-02-04 NOTE — PLAN OF CARE
Problem: Anxiety  Goal: Will report anxiety at manageable levels  Description: INTERVENTIONS:  1. Administer medication as ordered  2. Teach and rehearse alternative coping skills  3. Provide emotional support with 1:1 interaction with staff  2/3/2025 2132 by Jazmin Call, RN  Outcome: Progressing  Note: Patient is cooperative, friendly, pre-occupied. Endorses anxiety and depression at a manageable level. Denies suicidal ideations. Pt denies thoughts of self harm and is agreeable to seeking out should thoughts of self harm arise.  Safe environment maintained.  Q15 minute checks for safety cont per unit policy.  Will cont to monitor for safety and provides support and reassurance as needed.       Problem: Pain  Goal: Verbalizes/displays adequate comfort level or baseline comfort level  2/3/2025 2132 by Jazmin Call, RN  Outcome: Progressing

## 2025-02-04 NOTE — GROUP NOTE
Group Therapy Note    Date: 2/4/2025    Group Start Time: 1330  Group End Time: 1415  Group Topic: Cognitive Skills    Suzanna Crespo CTRS        Group Therapy Note    Attendees: 2/14     Patient's Goal:  To improve interpersonal skills and decision-making through collaborating with peers and concentrating on a presented task.    Notes:  Patient attended and participated in group. Patient was able to collaborate with peers and concentrate on a presented task. Patient was pleasant and cooperative.     Status After Intervention:  Improved    Participation Level: Active Listener and Interactive    Participation Quality: Appropriate and Attentive      Speech:  normal      Thought Process/Content: Logical and Linear      Affective Functioning: Congruent      Mood: euthymic      Level of consciousness:  Alert and Attentive      Response to Learning: Able to verbalize current knowledge/experience, Able to retain information, and Progressing to goal      Endings: None Reported    Modes of Intervention: Socialization, Exploration, Problem-solving, and Activity      Discipline Responsible: Psychoeducational Specialist      Signature:  ROBB Shah

## 2025-02-04 NOTE — BH NOTE
[facility]  Discharge Note    Pt discharged with followings belongings:   Dental Appliances: None  Vision - Corrective Lenses: Eyeglasses  Hearing Aid: None  Jewelry: Necklace  Body Piercings Removed: N/A  Clothing: Footwear, Jacket/Coat, Pants, Shirt, Socks, Undergarments, Other (Comment) (hoody)  Other Valuables: Credit/Debit Card, Cigarettes, Lighter/Matches, Other (Comment) (Vapes x3, THC)   Valuables sent home with patient or returned to patient. Patient educated on aftercare instructions: yes  Information faxed to [facility] by Staff  at 5:56 PM .Patient verbalize understanding of AVS:  yes.    Status EXAM upon discharge:  Mental Status and Behavioral Exam  Normal: No  Level of Assistance: Independent/Self  Facial Expression: Exaggerated  Affect: Appropriate  Level of Consciousness: Alert  Frequency of Checks: 4 times per hour, close  Mood:Normal: No  Mood: Depressed, Anxious  Motor Activity:Normal: Yes  Motor Activity: Increased  Eye Contact: Good  Observed Behavior: Cooperative, Friendly  Sexual Misconduct History: Current - no  Preception: [place] to person, [place] to time, [place] to place, [place] to situation  Attention:Normal: No  Attention: Distractible  Thought Processes: Flight of ideas  Thought Content:Normal: No  Thought Content: Preoccupations  Depression Symptoms: Impaired concentration, Feelings of helplessness  Anxiety Symptoms: Generalized  Holly Symptoms: No problems reported or observed.  Hallucinations: None  Delusions: No  Memory:Normal: Yes  Insight and Judgment: No  Insight and Judgment: Poor judgment, Poor insight    Tobacco Screening:  Practical Counseling, on admission, ewa X, if applicable and completed (first 3 are required if patient doesn't refuse):            ( ) Recognizing danger situations (included triggers and roadblocks)                    ( ) Coping skills (new ways to manage stress,relaxation techniques, changing routine, distraction)

## 2025-02-04 NOTE — GROUP NOTE
Group Therapy Note    Date: 2/4/2025    Group Start Time: 1000  Group End Time: 1042  Group Topic: Psychotherapy    [facility] Libby Back MSW        Group Therapy Note    Attendees: 4/13       Patient's Goal:  Discuss traits/values in others (fictional and non-ficitional) we admire; Reflect on what traits/values in ourselves we admire    Notes:     Status After Intervention:  Improved    Participation Level: Active Listener and Interactive    Participation Quality: Appropriate, Attentive, and Sharing      Speech:  normal      Thought Process/Content: Logical  Linear      Affective Functioning: Congruent      Mood: euthymic      Level of consciousness:  Alert and Oriented x4      Response to Learning: Able to verbalize current knowledge/experience and Able to verbalize/acknowledge new learning      Endings: None Reported    Modes of Intervention: Education and Support      Discipline Responsible: /Counselor      Signature:  NERY Saleh

## 2025-02-04 NOTE — GROUP NOTE
Group Therapy Note    Date: 2/4/2025    Group Start Time: 1100  Group End Time: 1140  Group Topic: Cognitive Skills    Suzanna Crespo CTRS        Group Therapy Note    Attendees: 5/13     Patient's Goal:  To improve interpersonal skills and memory recall through collaborating with peers and demonstrating self-expression.    Notes:  Patient attended and participated in group. Patient was able to collaborate with peers and demonstrate self-expression. Patient was pleasant and cooperative.     Status After Intervention:  Improved    Participation Level: Active Listener and Interactive    Participation Quality: Appropriate, Attentive, and Sharing      Speech:  normal      Thought Process/Content: Logical and Linear      Affective Functioning: Congruent       Mood: Euthymic       Level of consciousness:  Alert and Attentive      Response to Learning: Able to verbalize current knowledge/experience, Able to retain information, and Progressing to goal      Endings: None Reported    Modes of Intervention: Support, Socialization, and Activity      Discipline Responsible: Psychoeducational Specialist      Signature:  ROBB Shah

## 2025-02-04 NOTE — GROUP NOTE
Group Therapy Note    Date: 2/4/2025    Group Start Time: 1430  Group End Time: 1500  Group Topic: Relaxation    STC[facility] BHGenesis Darden RN        Group Therapy Note    Attendees: 5/13       Patient's Goal:  Relaxation and coping    Status After Intervention:  Improved    Participation Level: Active Listener    Participation Quality: Appropriate      Speech:  normal      Thought Process/Content: Logical      Affective Functioning: Flat      Mood: euthymic      Level of consciousness:  Alert, Oriented x4, and Attentive      Response to Learning: Progressing to goal      Endings: None Reported    Modes of Intervention: Support      Discipline Responsible: Registered Nurse      Signature:  Genesis Johnson RN

## 2025-02-04 NOTE — DISCHARGE SUMMARY
DISCHARGE SUMMARY      Patient ID:  Irma Huizar  [id number]  49 y.o.  1975    Admit date: 1/29/2025    Discharge date and time: 2/4/2025    Disposition: Home     Admitting Physician: Kevin Murrieta MD     Discharge Physician: Dr SHAWN Murrieta MD    Admission Diagnoses: Acute psychosis (HCC) [F23]    Admission Condition: poor    Discharged Condition: stable    Admission Circumstance: Irma Huizar is a 49 y.o. female has significant psychiatric history of bipolar disorder with psychotic features, antisocial personality disorder and a history of mixed substance abuse.  Patient was recently discharged from [facility] in [facility] on 1/22/2025 for suicidal ideations and auditory hallucinations.  On chart review patient has had multiple psychiatric hospitalizations for similar reasons.  Patient is not compliant with outpatient psychiatry and psychotropic medications.  Was incarcerated in 2023 for 11 months and has been on parole since February 2024 and living with her parents.  Her psychiatric medication on discharge from [facility] include lamotrigine 25 mg and Seroquel 50 mg.  Patient has also been dispensed risperidone 1 mg last dispensed in September 2024.       Patient presented to [facility] ED with extreme anxiety and drowsiness which she believes to be \"from her meds\".  Patient was asked to come in ED per her .  She denies any suicidal ideations and currently denies any hallucinations however patient has had previous history of auditory hallucinations.  In ED, potassium 3.3 sodium 138.  CBC WNL.  Ethanol less than 10.  Acetaminophen less than 5 salicylate less than 1.0.  UA negative for UTI.  UDS positive for amphetamines and cannabinoids.  Patient was vitally stable and afebrile.  Patient was noted to have bilateral swelling of both hands along with mild erythema.  Patient endorses symptoms of pain on movement with joint and palpation.  She denies any recent event or trauma that occurred

## 2025-02-04 NOTE — DISCHARGE INSTR - DIET

## 2025-02-04 NOTE — GROUP NOTE
Group Therapy Note    Date: 2/4/2025    Group Start Time: 0900  Group End Time: 0920  Group Topic: Community Meeting    Yara Arboleda        Group Therapy Note    Attendees: 5       Patient's Goal:  attend groups    Status After Intervention:  Improved    Participation Level: Active Listener    Participation Quality: Appropriate and Attentive      Speech:  normal      Thought Process/Content: Logical      Affective Functioning: Congruent      Mood: euthymic      Level of consciousness:  Alert and Oriented x4      Response to Learning: Able to verbalize current knowledge/experience and Progressing to goal      Endings: None Reported    Modes of Intervention: Socialization      Discipline Responsible: Behavorial Health Tech      Signature:  Yara Lynch

## 2025-02-04 NOTE — TRANSITION OF CARE
Behavioral Health Transition Record    Patient Name: Irma Huizar  YOB: 1975   Medical Record Number: [id number]  Date of Admission: 1/29/2025  4:06 AM   Date of Discharge: 02/04/25    Attending Provider: Kevin Murrieta MD   Discharging Provider: Kevin Murrieta MD  To contact this individual call [phone] and ask the  to page.  If unavailable, ask to be transferred to Behavioral Health Provider on call.  A Behavioral Health Provider will be available on call 24/7 and during holidays.    Primary Care Provider: No primary care provider on file.    Allergies   Allergen Reactions    Phenobarbital Other (See Comments)     seizure       Reason for Admission: Attending Physician Psychiatric Assessment   Patient: Irma Huizar  MRN: [id number]  Reason for Admission to Psychiatric Unit:  Threat to self requiring 24 hour professional observation  Threat to others requiring 24 hour professional observation  Acute disordered/bizarre behavior or psychomotor agitation or retardation;interferes with ADLs so that patient cannot function at a less intensive care level of care during evaluation and treatment   Concerns about patient's safety in the community  Past Mental Health Diagnosis: a history of  Bipolar Disorder, Personality Disorder, and Alcohol and/or Drug Use Disorder  Triggering event or precipitating factor: Housing instability, Financial instability, Alcohol/Drug Relapse, and Psych Treatment Noncompliance  Length of time/duration of triggering event: Months  Legal Status: Voluntary     CHIEF COMPLAINT: Acute psychosis       Admission Diagnosis: Acute psychosis (HCC) [F23]    * No surgery found *    Results for orders placed or performed during the hospital encounter of 01/29/25   RYAN Screen with Reflex   Result Value Ref Range    RYAN NEGATIVE NEGATIVE    MAGGIE Antibodies Screen 0.1 <0.7 U/mL    Anti ds DNA 1.5 <10.0 IU/mL   Rheumatoid Factor   Result Value Ref Range    Rheumatoid Factor 10 0 - 13 IU/mL

## 2025-02-04 NOTE — DISCHARGE INSTR - COC
Continuity of Care Form    Patient Name: Irma Huizar   :  1975  MRN:  [id number]    Admit date:  2025  Discharge date:  ***    Code Status Order: Full Code   Advance Directives:   Advance Care Flowsheet Documentation             Admitting Physician:  Kevin Murrieta MD  PCP: No primary care provider on file.    Discharging Nurse: ***  Discharging Hospital Unit/Room#: 0210/0210-02  Discharging Unit Phone Number: ***    Emergency Contact:   Extended Emergency Contact Information  Primary Emergency Contact: Deidra Huizar  Address: [place] [address] [facility]  Home Phone: [phone]  Relation: Parent  Secondary Emergency Contact: Lorna Huizar   [facility]  Home Phone: [phone]  Relation: Brother/Sister    Past Surgical History:  Past Surgical History:   Procedure Laterality Date    CHOLECYSTECTOMY      LAPAROSCOPY         Immunization History:   Immunization History   Administered Date(s) Administered    COVID-19, PFIZER PURPLE top, DILUTE for use, (age 12 y+), 30mcg/0.3mL 2021    TDaP, ADACEL (age 10y-64y), BOOSTRIX (age 10y+), IM, 0.5mL 2014       Active Problems:  Patient Active Problem List   Diagnosis Code    Severe episode of recurrent major depressive disorder, with psychotic features (HCC) F33.3    Bipolar disorder, curr episode depressed, severe, w/psychotic features (HCC) F31.5    Hepatitis C virus infection B19.20    Acute psychosis (HCC) F23       Isolation/Infection:   Isolation            No Isolation          Patient Infection Status       None to display            Nurse Assessment:  Last Vital Signs: /82   Pulse 73   Temp 97.8 °F (36.6 °C) (Temporal)   Resp 14   Ht 1.753 m (5' 9\")   Wt 63.5 kg (140 lb)   LMP 2025 (Approximate)   SpO2 97%   BMI 20.67 kg/m²     Last documented pain score (0-10 scale): Pain Level: 10  Last Weight:   Wt Readings from Last 1 Encounters:   25 63.5 kg (140 lb)     Mental

## 2025-02-04 NOTE — BH NOTE
Patient given tobacco quitline number 12979747774 at this time, refusing to call at this time, states \" I just dont want to quit now\"- patient given information as to the dangers of long term tobacco use. Continue to reinforce the importance of tobacco cessation.

## 2025-02-04 NOTE — PROGRESS NOTES
Behavioral Services  Medicare Certification Upon Admission    I certify that this patient's inpatient psychiatric hospital admission is medically necessary for:    [x] (1) Treatment which could reasonably be expected to improve this patient's condition,       [x] (2) Or for diagnostic study;     AND     [x](2) The inpatient psychiatric services are provided while the individual is under the care of a physician and are included in the individualized plan of care.    Estimated length of stay/service 2-9 days    Plan for post-hospital care -outpatient care    Electronically signed by LIZBET DELEON MD on 1/29/2025 at 1:53 PM      
    [facility] Internal Medicine  George Boogie MD; Horacio Roland MD, Franklin Cornell MD, Kateryna Rabago MD, Miguel Rock MD; Mason Walker MD    [facility] Internal Medicine   IN-PATIENT SERVICE   [facility]     HISTORY AND PHYSICAL EXAMINATION            Date:   2/4/2025  Patient name:  Irma Huizar  Date of admission:  1/29/2025  4:06 AM  MRN:   [id number]  Account:  [id number]  YOB: 1975  PCP:    No primary care provider on file.  Room:   [address] [place] [address]  Code Status:    Full Code      Chief Complaint:   Right hand pain    History Obtained From:     Patient/EMR/bedside RN     History of Present Illness:     49-year-old  lady edentulous history of mental health disorder history of street drug abuse complaint of right hand pain and swelling denies any injury claims is going on for 3 weeks  is weaker due to swelling and pain in the joints  Patient does not have any history of rheumatoid arthritis no history of lupus no injuries    Past Medical History:     Past Medical History:   Diagnosis Date    Anxiety     Bipolar disorder (HCC)     Cervical cancer (HCC)     Depression     Heroin abuse (HCC)     Neuropathy         Past Surgical History:     Past Surgical History:   Procedure Laterality Date    CHOLECYSTECTOMY      LAPAROSCOPY          Medications Prior to Admission:     Prior to Admission medications    Medication Sig Start Date End Date Taking? Authorizing Provider   amitriptyline (ELAVIL) 25 MG tablet Take 1 tablet by mouth nightly 2/4/25  Yes Kevin Murrieta MD   nicotine polacrilex (COMMIT) 2 MG lozenge Take 1 lozenge by mouth every 2 hours as needed for Smoking cessation 2/4/25  Yes Kevin Murrieta MD   risperiDONE (RISPERDAL) 1 MG tablet Take 1 tablet by mouth 2 times daily 2/4/25  Yes Kevin Murrieta MD   traZODone (DESYREL) 50 MG tablet Take 1 tablet by mouth nightly as needed for Sleep 2/4/25  Yes Kevin Murrieta MD 
    [facility] Internal Medicine  George Boogie MD; Horacio Roland MD, Franklin Corenll MD, Kateryna Rabago MD, Miguel Rock MD; Mason Walker MD    [facility] Internal Medicine   IN-PATIENT SERVICE   [facility]     HISTORY AND PHYSICAL EXAMINATION            Date:   1/30/2025  Patient name:  Irma Huizar  Date of admission:  1/29/2025  4:06 AM  MRN:   [id number]  Account:  [id number]  YOB: 1975  PCP:    No primary care provider on file.  Room:   [address] [place] [address]  Code Status:    Full Code      Chief Complaint:   Right hand pain    History Obtained From:     Patient/EMR/bedside RN     History of Present Illness:     49-year-old  lady edentulous history of mental health disorder history of street drug abuse complaint of right hand pain and swelling denies any injury claims is going on for 3 weeks  is weaker due to swelling and pain in the joints  Patient does not have any history of rheumatoid arthritis no history of lupus no injuries    Past Medical History:     Past Medical History:   Diagnosis Date    Anxiety     Bipolar disorder (HCC)     Cervical cancer (HCC)     Depression     Heroin abuse (HCC)     Neuropathy         Past Surgical History:     Past Surgical History:   Procedure Laterality Date    CHOLECYSTECTOMY      LAPAROSCOPY          Medications Prior to Admission:     Prior to Admission medications    Medication Sig Start Date End Date Taking? Authorizing Provider   QUEtiapine (SEROQUEL XR) 50 MG extended release tablet Take 1 tablet by mouth nightly    ProviderIsamar MD   hydrOXYzine HCl (ATARAX) 50 MG tablet Take 1 tablet by mouth daily as needed for Anxiety    ProviderIsamar MD   lamoTRIgine (LAMICTAL) 25 MG tablet Take 2 tablets by mouth 2 times daily    ProviderIsamar MD   albuterol sulfate HFA (PROVENTIL;VENTOLIN;PROAIR) 108 (90 Base) MCG/ACT inhaler Inhale 2 puffs into the lungs every 6 hours as 
    [facility] Internal Medicine  George Boogie MD; Horacio Roland MD, Franklin Cornell MD, Kateryna Rabago MD, Miguel Rock MD; Mason Walker MD    [facility] Internal Medicine   IN-PATIENT SERVICE   [facility]     HISTORY AND PHYSICAL EXAMINATION            Date:   2/2/2025  Patient name:  Irma Huizar  Date of admission:  1/29/2025  4:06 AM  MRN:   [id number]  Account:  [id number]  YOB: 1975  PCP:    No primary care provider on file.  Room:   [address] [place] [address]  Code Status:    Full Code      Chief Complaint:   Right hand pain    History Obtained From:     Patient/EMR/bedside RN     History of Present Illness:     49-year-old  lady edentulous history of mental health disorder history of street drug abuse complaint of right hand pain and swelling denies any injury claims is going on for 3 weeks  is weaker due to swelling and pain in the joints  Patient does not have any history of rheumatoid arthritis no history of lupus no injuries    Past Medical History:     Past Medical History:   Diagnosis Date    Anxiety     Bipolar disorder (HCC)     Cervical cancer (HCC)     Depression     Heroin abuse (HCC)     Neuropathy         Past Surgical History:     Past Surgical History:   Procedure Laterality Date    CHOLECYSTECTOMY      LAPAROSCOPY          Medications Prior to Admission:     Prior to Admission medications    Medication Sig Start Date End Date Taking? Authorizing Provider   QUEtiapine (SEROQUEL XR) 50 MG extended release tablet Take 1 tablet by mouth nightly    ProviderIsamar MD   hydrOXYzine HCl (ATARAX) 50 MG tablet Take 1 tablet by mouth daily as needed for Anxiety    ProviderIsamra MD   lamoTRIgine (LAMICTAL) 25 MG tablet Take 2 tablets by mouth 2 times daily    ProviderIsamar MD   albuterol sulfate HFA (PROVENTIL;VENTOLIN;PROAIR) 108 (90 Base) MCG/ACT inhaler Inhale 2 puffs into the lungs every 6 hours as 
    [facility] Internal Medicine  George Boogie MD; Horacio Roland MD, Franklin Cornell MD, Katreyna Rabago MD, Miguel Rock MD; Mason Walker MD    [facility] Internal Medicine   IN-PATIENT SERVICE   [facility]     HISTORY AND PHYSICAL EXAMINATION            Date:   2/1/2025  Patient name:  Irma Huizar  Date of admission:  1/29/2025  4:06 AM  MRN:   [id number]  Account:  [id number]  YOB: 1975  PCP:    No primary care provider on file.  Room:   [address] [place] [address]  Code Status:    Full Code      Chief Complaint:   Right hand pain    History Obtained From:     Patient/EMR/bedside RN     History of Present Illness:     49-year-old  lady edentulous history of mental health disorder history of street drug abuse complaint of right hand pain and swelling denies any injury claims is going on for 3 weeks  is weaker due to swelling and pain in the joints  Patient does not have any history of rheumatoid arthritis no history of lupus no injuries    Past Medical History:     Past Medical History:   Diagnosis Date    Anxiety     Bipolar disorder (HCC)     Cervical cancer (HCC)     Depression     Heroin abuse (HCC)     Neuropathy         Past Surgical History:     Past Surgical History:   Procedure Laterality Date    CHOLECYSTECTOMY      LAPAROSCOPY          Medications Prior to Admission:     Prior to Admission medications    Medication Sig Start Date End Date Taking? Authorizing Provider   QUEtiapine (SEROQUEL XR) 50 MG extended release tablet Take 1 tablet by mouth nightly    ProviderIsamar MD   hydrOXYzine HCl (ATARAX) 50 MG tablet Take 1 tablet by mouth daily as needed for Anxiety    ProviderIsamar MD   lamoTRIgine (LAMICTAL) 25 MG tablet Take 2 tablets by mouth 2 times daily    ProviderIsamar MD   albuterol sulfate HFA (PROVENTIL;VENTOLIN;PROAIR) 108 (90 Base) MCG/ACT inhaler Inhale 2 puffs into the lungs every 6 hours as 
  Daily Progress Note  2/1/2025    Patient Name: Irma Huizar    CHIEF COMPLAINT: Acute psychosis         SUBJECTIVE:    Patient was seen for follow-up assessment today.  Patient has been mostly isolative to her room and expressing ongoing symptoms of depression and anxiety.  She has remained in behavioral control. She was approached in her room where she was found seated at the desk.  She was agreeable to conversation.  Patient was focused on swelling of hands and believes that there is something more going on than arthritis.  She was encouraged to follow-up with primary care provider following discharge.  Patient discussed AOD treatment options.  She has not yet made any phone calls and was encouraged to do so.  If numbers that she wanted for particular places were not in the green folder they could be provided to her.  Patient expressed belief that  would make arrangements for her but she was told she would need to get the ball rolling and make phone calls herself.  She then stated that her dad does all of that for her.  She denied any current perceptual disturbances.  She denied any homicidal or suicidal ideation.    Appetite:  [x] Adequate/Unchanged  [] Increased  [] Decreased      Sleep:       [] Adequate/Unchanged  [x] Fair  [] Poor      Group Attendance on Unit:   [] Yes   [x] Selectively    [] No    Compliant with scheduled medications: [x] Yes  [] No    Received emergency medications in past 24 hrs: [] Yes   [x] No    Medication Side Effects: Denies         Mental Status Exam  Level of consciousness: Alert and awake   Appearance: Appropriate attire for setting, seated in chair, with fair  grooming and hygiene   Behavior/Motor: Approachable, engages with interviewer, no psychomotor abnormalities   Attitude toward examiner: Cooperative, attentive, good eye contact  Speech: spontaneous, normal rate, and normal volume   Mood: Anxious  Affect: Congruent  Thought processes: linear and coherent 
  Daily Progress Note  2/2/2025    Patient Name: Irma Huizar    CHIEF COMPLAINT: Acute psychosis         SUBJECTIVE:    Patient was seen for follow-up assessment today.  Nursing staff report she has been intermittently social in the day area and did attend group.  She has been calm and cooperative.  Patient was resting in bed on approach.  Mood was more pleasant today and she was not as anxious.  Patient expressed that overall she is feeling better.  She is not having any suicidal or homicidal thoughts.  Patient expressed that she had been up for most of the day today and just felt like taking a nap.  She has enjoyed group today.  She is denying auditory and visual hallucinations or any other perceptual disturbances today.  Patient expressed that tomorrow she will determine appropriate AOD placement.    Appetite:  [x] Adequate/Unchanged  [] Increased  [] Decreased      Sleep:       [] Adequate/Unchanged  [x] Fair  [] Poor      Group Attendance on Unit:   [] Yes   [x] Selectively    [] No    Compliant with scheduled medications: [x] Yes  [] No    Received emergency medications in past 24 hrs: [] Yes   [x] No    Medication Side Effects: Denies         Mental Status Exam  Level of consciousness: Alert and awake   Appearance: Appropriate attire for setting, resting in bed, with fair  grooming and hygiene   Behavior/Motor: Approachable, engages with interviewer, no psychomotor abnormalities   Attitude toward examiner: Cooperative, attentive, fair eye contact  Speech: spontaneous, normal rate, and normal volume   Mood: Euthymic  Affect: Congruent  Thought processes: linear and coherent   Thought content:  Denies homicidal ideation  Suicidal Ideation: Denies suicidal ideations, contracts for safety on the unit.   Delusions: No evidence of delusions.   Perceptual Disturbance: Denies perceptual disturbances; patient does not appear to be responding to internal stimuli.   Cognition: Oriented to self, location, time, and 
BEHAVIORAL HEALTH FOLLOW-UP NOTE     1/31/2025     Patient was seen and examined in person, Chart reviewed   Patient's case discussed with staff/team    Chief Complaint: Acute Psychosis with methamphetamine and marijuana use     Interim History:     Patient evaluated today. She was resting on her bed. Reports that she has been increasingly drowsy and has been sleeping a lot, which she is not happy with. Patient did speak to her mother today. She gets intermittently tearful on exam depending on the topic. We were speaking about possible places for her to go following discharge and whether she thinks she can go back to her parent's house, but patient said they won't take her back and she does not feel safe there. I also spoke briefly to the patient about the possibility that her drug use may be contributing to this. She reports she can stop the meth but will not stop smoking marijuana, which she does daily. Patient is unsure what medications she was on for anxiety before. Reports continued anxiety and panic. She has been compliant with her risperidone 1 mg BID here. Patient did report that she chronically has auditory and visual hallucinations that are persecutory in nature. She reports seeing images of her own body parts cut up along with voices telling her \"they will cut you up,\" but she is unsure who \"they\" are. Patient reports continued auditory hallucinations but denies any visual hallucinations currently. Denies any suicidal or homicidal ideations. Reports normal appetite otherwise.     /83   Pulse 76   Temp 98.6 °F (37 °C) (Temporal)   Resp 14   Ht 1.753 m (5' 9\")   Wt 63.5 kg (140 lb)   LMP 01/28/2025 (Approximate)   SpO2 97%   BMI 20.67 kg/m²   Appetite:   [x] Normal/Unchanged  [] Increased  [] Decreased      Sleep:       [x] Normal/Unchanged  [] Fair       [] Poor              Energy:    [] Normal/Unchanged  [] Increased  [x] Decreased        Aggression:  [] yes  [x] no    Patient is [x] able  
Met with patient 1:1 to discuss patient concerns regarding Amitriptyline. Reviewed side effects, dosing, and indication with patient. Patient reported she takes Gabapentin from her family members. Discussed risks/benefits of Gabapentin and stepwise approach to pain management.  
Pharmacy Medication History Note      List of current medications patient is taking is complete.    Source of information: [email or web address] ([place] at [phone]), Epic, PDMP, [employer] dispense report    Changes made to medication list:  Medications removed (include reason, ex. therapy complete or physician discontinued, noncompliance):  Quetiapine IR (alternate therapy), Vraylar (list clean up), Risperidone (list clean up), Trazodone (list clean up)    Medications flagged for provider review:  none    Medications added/doses adjusted:  Added Quetiapine XR 50 mg nightly    Other notes (ex. Recent course of antibiotics, Coumadin dosing):  Patient was [facility] to [facility]/15[facility] to 1/22/25 for MDD. Discharged on Lamotrigine and Quetiapine.   Patient was previously on Vraylar 1.5 mg daily and Risperidone 1 mg twice daily.       Current Home Medication List at Time of Admission:  Prior to Admission medications    Medication Sig   QUEtiapine (SEROQUEL XR) 50 MG extended release tablet Take 1 tablet by mouth nightly   hydrOXYzine HCl (ATARAX) 50 MG tablet Take 1 tablet by mouth daily as needed for Anxiety   lamoTRIgine (LAMICTAL) 25 MG tablet Take 2 tablets by mouth 2 times daily   albuterol sulfate HFA (PROVENTIL;VENTOLIN;PROAIR) 108 (90 Base) MCG/ACT inhaler Inhale 2 puffs into the lungs every 6 hours as needed for Shortness of Breath or Wheezing         Please let me know if you have any questions about this encounter. Thank you!    Electronically signed by Vern Vega RPH on 1/29/2025 at 11:09 AM     
RT ASSESSMENT TREATMENT GOALS    [x]Pt Goal: Pt will identify 1-2 positive coping skills by time of discharge.    []Pt Goal: Pt will identify 1-2 positive aspects of self by time of discharge.    []Pt Goal: Pt will remain on task/topic for 15-30 minutes during group by time of discharge.    [x]Pt Goal: Pt will identify 1-2 aspects of relapse prevention plan by time of discharge.    []Pt Goal: Pt will join in conversation with peers 1-2 times per group by time of discharge.    []Pt Goal: Pt will identify 1-2 new leisure interests by time of discharge.    []Pt Goal: Pt will not voice any delusional content by time of discharge.    
needed for Shortness of Breath or Wheezing 24   Ariel Murphy, DNP        Allergies:     Phenobarbital    Social History:     Tobacco:    reports that she has been smoking cigarettes. She does not have any smokeless tobacco history on file.  Alcohol:      reports current alcohol use of about 3.0 standard drinks of alcohol per week.  Drug Use:  reports current drug use. Frequency: 7.00 times per week. Drugs: Marijuana (Weed), Methamphetamines (Crystal Meth), and Cocaine.    Family History:     Family History   Problem Relation Age of Onset    Neuropathy Mother     Neuropathy Father        Review of Systems:     Positive and Negative as described in HPI.    CONSTITUTIONAL:  negative for fevers, chills, sweats, fatigue, weight loss  HEENT:  negative for vision, hearing changes, runny nose, throat pain  RESPIRATORY:  negative for shortness of breath, cough, congestion, wheezing.  CARDIOVASCULAR:  negative for chest pain, palpitations.  GASTROINTESTINAL:  negative for nausea, vomiting, diarrhea, constipation, change in bowel habits, abdominal pain   GENITOURINARY:  negative for difficulty of urination, burning with urination, frequency   INTEGUMENT:  negative for rash, skin lesions, easy bruising   HEMATOLOGIC/LYMPHATIC:  negative for swelling/edema   ALLERGIC/IMMUNOLOGIC:  negative for urticaria , itching  ENDOCRINE:  negative increase in drinking, increase in urination, hot or cold intolerance  MUSCULOSKELETAL: Right hand pain and swelling NEUROLOGICAL:  negative for headaches, dizziness, lightheadedness, numbness, pain, tingling extremities      Physical Exam:     /81   Pulse 76   Temp 97.3 °F (36.3 °C) (Temporal)   Resp 16   Ht 1.753 m (5' 9\")   Wt 63.5 kg (140 lb)   LMP 2025 (Approximate)   SpO2 98%   BMI 20.67 kg/m²   Temp (24hrs), Av.7 °F (36.5 °C), Min:97.3 °F (36.3 °C), Max:98.2 °F (36.8 °C)    No results for input(s): \"POCGLU\" in the last 72 hours.  No intake or output data in 
needed for Shortness of Breath or Wheezing 24   Ariel Murphy, DNP        Allergies:     Phenobarbital    Social History:     Tobacco:    reports that she has been smoking cigarettes. She does not have any smokeless tobacco history on file.  Alcohol:      reports current alcohol use of about 3.0 standard drinks of alcohol per week.  Drug Use:  reports current drug use. Frequency: 7.00 times per week. Drugs: Marijuana (Weed), Methamphetamines (Crystal Meth), and Cocaine.    Family History:     Family History   Problem Relation Age of Onset    Neuropathy Mother     Neuropathy Father        Review of Systems:     Positive and Negative as described in HPI.    CONSTITUTIONAL:  negative for fevers, chills, sweats, fatigue, weight loss  HEENT:  negative for vision, hearing changes, runny nose, throat pain  RESPIRATORY:  negative for shortness of breath, cough, congestion, wheezing.  CARDIOVASCULAR:  negative for chest pain, palpitations.  GASTROINTESTINAL:  negative for nausea, vomiting, diarrhea, constipation, change in bowel habits, abdominal pain   GENITOURINARY:  negative for difficulty of urination, burning with urination, frequency   INTEGUMENT:  negative for rash, skin lesions, easy bruising   HEMATOLOGIC/LYMPHATIC:  negative for swelling/edema   ALLERGIC/IMMUNOLOGIC:  negative for urticaria , itching  ENDOCRINE:  negative increase in drinking, increase in urination, hot or cold intolerance  MUSCULOSKELETAL: Right hand pain and swelling NEUROLOGICAL:  negative for headaches, dizziness, lightheadedness, numbness, pain, tingling extremities      Physical Exam:     /83   Pulse 76   Temp 98.6 °F (37 °C) (Temporal)   Resp 14   Ht 1.753 m (5' 9\")   Wt 63.5 kg (140 lb)   LMP 2025 (Approximate)   SpO2 97%   BMI 20.67 kg/m²   Temp (24hrs), Av.2 °F (36.8 °C), Min:97.7 °F (36.5 °C), Max:98.6 °F (37 °C)    No results for input(s): \"POCGLU\" in the last 72 hours.  No intake or output data in the 
Allergic/Immunologic    Explanation:     MEDICATIONS:    Current Facility-Administered Medications:     amitriptyline (ELAVIL) tablet 25 mg, 25 mg, Oral, Nightly, Kevin Murrieta MD    predniSONE (DELTASONE) tablet 20 mg, 20 mg, Oral, Daily, George Boogie MD, 20 mg at 02/03/25 0840    nitrofurantoin (macrocrystal-monohydrate) (MACROBID) capsule 100 mg, 100 mg, Oral, 2 times per day, George Boogie MD, 100 mg at 02/03/25 0840    acetaminophen (TYLENOL) tablet 650 mg, 650 mg, Oral, Q6H PRN, Kevin Murrieta MD, 650 mg at 02/03/25 0841    hydrOXYzine HCl (ATARAX) tablet 50 mg, 50 mg, Oral, TID PRN, Kevin Murrieta MD, 50 mg at 02/03/25 0840    traZODone (DESYREL) tablet 50 mg, 50 mg, Oral, Nightly PRN, Kevin Murrieta MD, 50 mg at 02/02/25 2049    polyethylene glycol (GLYCOLAX) packet 17 g, 17 g, Oral, Daily PRN, Kevin Murrieta MD    aluminum & magnesium hydroxide-simethicone (MAALOX) 200-200-20 MG/5ML suspension 30 mL, 30 mL, Oral, Q6H PRN, Kevin Murrieta MD    nicotine polacrilex (COMMIT) lozenge 2 mg, 2 mg, Oral, Q2H PRN, Kevin Murrieta MD, 2 mg at 02/03/25 0840    risperiDONE (RISPERDAL) tablet 1 mg, 1 mg, Oral, BID, Kevin Murrieta MD, 1 mg at 02/03/25 0841      Examination:  /81   Pulse 76   Temp 97.3 °F (36.3 °C) (Temporal)   Resp 16   Ht 1.753 m (5' 9\")   Wt 63.5 kg (140 lb)   LMP 01/28/2025 (Approximate)   SpO2 98%   BMI 20.67 kg/m²   Gait - steady  Medication side effects(SE): none    Mental Status Examination:    Level of consciousness:  within normal limits   Appearance:  fair grooming and fair hygiene  Behavior/Motor:  no abnormalities noted  Attitude toward examiner:  cooperative, attentive, and good eye contact  Speech:  spontaneous, normal volume, and rapid   Mood: anxious  Affect:  mood congruent  Thought processes:  linear, goal directed, coherent, and rapid   Thought content:  Homicidal ideation - none  Suicidal Ideation:  denies suicidal ideation  Delusions:  no evidence of 
01/29/25 0802 01/29/25 1350 01/29/25 2015 01/30/25 0830   BP: 123/76 (!) 141/75 127/60 124/87   Pulse: 84 72 71 70   Resp: 18  15 16   Temp: 98 °F (36.7 °C)  98.4 °F (36.9 °C) 98.6 °F (37 °C)   TempSrc: Temporal  Temporal Oral   SpO2: 96%  97% 96%   Weight:       Height:             Current Facility-Administered Medications   Medication Dose Route Frequency Provider Last Rate Last Admin    nitrofurantoin (macrocrystal-monohydrate) (MACROBID) capsule 100 mg  100 mg Oral 2 times per day George Boogie MD        ibuprofen (ADVIL;MOTRIN) tablet 800 mg  800 mg Oral Q8H PRN George Boogie MD   800 mg at 01/29/25 1402    acetaminophen (TYLENOL) tablet 650 mg  650 mg Oral Q6H PRN Kevin Deleon MD   650 mg at 01/29/25 2031    hydrOXYzine HCl (ATARAX) tablet 50 mg  50 mg Oral TID PRN Kevin Deleon MD   50 mg at 01/30/25 0846    traZODone (DESYREL) tablet 50 mg  50 mg Oral Nightly PRN Kevin Deleon MD        polyethylene glycol (GLYCOLAX) packet 17 g  17 g Oral Daily PRN Kevin Deleon MD        aluminum & magnesium hydroxide-simethicone (MAALOX) 200-200-20 MG/5ML suspension 30 mL  30 mL Oral Q6H PRN Kevin Deleon MD        nicotine polacrilex (COMMIT) lozenge 2 mg  2 mg Oral Q2H PRN Kevin Deleon MD        risperiDONE (RISPERDAL) tablet 1 mg  1 mg Oral BID Kevin Deleno MD   1 mg at 01/30/25 0846        The patient was seen face-to-face.  The patient remains depressed and very tearful.  She has mood lability.  She denies hallucinations.  She has been compliant with her medications and finds them somewhat sedating.  No changes been made to her medications today     PLAN  Medications as noted above  Attempt to develop insight  Psycho-education conducted.  Supportive Therapy conducted.  Follow-up daily while on inpatient unit    Electronically signed by KEVIN DELEON MD on 1/30/25 at 8:03 PM EST

## 2025-02-04 NOTE — DISCHARGE INSTRUCTIONS
Information:  Medications:   Medication summary provided   I understand that I should take only the medications on my list.     -why and when I need to take each medicine.     -which side effects to watch for.     -that I should carry my medication information at all times in case of     Emergency situations.    I will take all of my medicines to follow up appointments.     -check with my physician or pharmacist before taking any new    Medication, over the counter product or drink alcohol.    -Ask about food, drug or dietary supplement interactions.    -discard old lists and update records with medication providers.    Notify Physician:  Notify physician if you notice:   Always call 911 if you feel your life is in danger  In case of an emergency call 911 immediately!  If 911 is not available call your local emergency medical system for help    Behavioral Health Follow Up:  Original Referral Source:[place]  Discharge Diagnosis: Acute psychosis (HCC) [F23]  Recommendations for Level of Care: [facility] with follow -up appointments  Patient status at discharge: Stable with follow- up appointments  My hospital  was: [facility]   Aftercare plan faxed: [employer]   -faxed by: Staff   -date: 02/04/25   -time: 1500  Prescriptions: yes    Smoking: Quit Smoking.   Call the [employer]'s smoking quitline at 5-267-81U-QUIT  Know the signs of a heart attack   If you have any of the following symptoms call 911 immediately, do not wait more    Than five minutes.    1. Pressure, fullness and/ or squeezing in the center of the chest spreading to    The jaw, neck or shoulder.    2. Chest discomfort with light headedness, fainting, sweating, nausea or    Shortness of breath.   3. Upper abdominal pressure or discomfort.   4. Lower chest pain, back pain, unusual fatigue, shortness of breath, nausea   Or dizziness.     General Information:   Questions regarding your bill: Call HELP program [phone]     Suicide

## 2025-02-04 NOTE — DISCHARGE INSTR - PHARMACY
[facility] - [place], [place] - [address] -  [phone] - F [phone]

## 2025-02-05 NOTE — CARE COORDINATION
Discharge Arrangements:  Patient follows up with outpatient treatment at [facility] in [facility]    She requested walk-in information for [facility] in [facility] she reports in the future she plans to relocate to [place], [place]. Patient was provided with walk-in information for [facility] per her request.     Guardian notified: N/A   Discharge destination/address: Home   [address] [place] [address]    Transported by:  [facility]     Patient was accepting of referral.  Follow up appointment is scheduled for   [facility]   [facility]   [address]  \[place] [address]  [phone]     [facility]  [address] [place] [address]  [phone]  fax [phone]   You can attend a walk-in appointment Monday-Fridat Between 8:00am-4:00pm to establish services with [facility]   *ALEE resources were offered to patient throughout admission and at time of discharge. This list of [facility] providers was provided to patient:     [facility]  [address] [facility] [address]   [address] [address]  Phone: [phone]     Phone: [phone]    [facility]   [address] [facility] [address] [address]   [address] [facility] [address]  Phone: [phone]     Phone: [phone]    [facility]  [address] [place] [address]    [address] [facility] [address]  Phone: [phone]     Phone: [phone]    [facility]     [facility]   [address] [facility] [address]  [place] [address]      Phone: [phone]  Phone: [phone]        [facility]  [address] [facility] [address]   [address] [place] [address]  Phone: [phone]     Phone:  [phone]    [facility]      [facility]  [address] [facility] [address] [address] 
Name: Irma Huizar    : 1975    Auth number: [id number]     Discharge Date: 2025    Destination: home    *If you have any specific discharge questions, please contact the assigned /discharge planner: Miguel [phone]      Discharge Medications:      Medication List        START taking these medications      amitriptyline 25 MG tablet  Commonly known as: ELAVIL  Take 1 tablet by mouth nightly  Notes to patient: Mood     nicotine polacrilex 2 MG lozenge  Commonly known as: COMMIT  Take 1 lozenge by mouth every 2 hours as needed for Smoking cessation  Notes to patient: Smoking Cessation     traZODone 50 MG tablet  Commonly known as: DESYREL  Take 1 tablet by mouth nightly as needed for Sleep  Notes to patient: Sleep            CONTINUE taking these medications      hydrOXYzine HCl 50 MG tablet  Commonly known as: ATARAX  Notes to patient: Anxiety     risperiDONE 1 MG tablet  Commonly known as: RISPERDAL  Take 1 tablet by mouth 2 times daily  Notes to patient: Mood            STOP taking these medications      albuterol sulfate  (90 Base) MCG/ACT inhaler  Commonly known as: PROVENTIL;VENTOLIN;PROAIR     lamoTRIgine 25 MG tablet  Commonly known as: LAMICTAL     QUEtiapine 50 MG extended release tablet  Commonly known as: SEROQUEL XR               Where to Get Your Medications        These medications were sent to [facility] [address] [place] [address] [phone] -  [phone]  [address] [place] [address]      Phone: [phone]   amitriptyline 25 MG tablet  nicotine polacrilex 2 MG lozenge  risperiDONE 1 MG tablet  traZODone 50 MG tablet     Pharmacy Instructions:    [facility] [address] [place] [address] - [address] [place] [address] [phone] - F [phone]            Follow Up Appointment: [facility]  [address] [address]  Follow up in 1 week(s)      [address] 
Irma Huizar is a 49 y.o. female who presents on admission with suicidal ideation. Patient reports a recent increase in symptoms of Anxiety and Depression and is endorsing feelings of helplessness, hopelessness, and worthlessness. Patient reports having a lot of stress at home with family, does admit to some methamphetamine use a few days ago, denies any drug or alcohol use today.   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 Patient had a recent inpatient Psychiatric stay at [facility] in [place] from 1/15-1/22/2025. Patient is not compliant with outpatient psychiatry and psychotropic medications. Was incarcerated in 2023 for 11 months and has been on parole since February 2024 and living with her parents. Patient reports that her  is Miguel Patel [phone]. Patient reports that her  told her that she doesn't nee to return back to live with her parents and told her that she is able to stay in [place], [place] to live. Patient was unable to identify where her  identified where she can live. Patient reports that she know of a program at [facility] in [place] that she is interested in . Patient was provided with AOD resource information on this date.  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